# Patient Record
Sex: FEMALE | Race: BLACK OR AFRICAN AMERICAN | Employment: OTHER | URBAN - METROPOLITAN AREA
[De-identification: names, ages, dates, MRNs, and addresses within clinical notes are randomized per-mention and may not be internally consistent; named-entity substitution may affect disease eponyms.]

---

## 2017-10-23 ENCOUNTER — OFFICE VISIT (OUTPATIENT)
Dept: ORTHOPEDIC SURGERY | Age: 47
End: 2017-10-23

## 2017-10-23 VITALS
HEIGHT: 65 IN | DIASTOLIC BLOOD PRESSURE: 89 MMHG | HEART RATE: 97 BPM | WEIGHT: 192 LBS | BODY MASS INDEX: 31.99 KG/M2 | SYSTOLIC BLOOD PRESSURE: 129 MMHG

## 2017-10-23 DIAGNOSIS — M25.562 LEFT KNEE PAIN, UNSPECIFIED CHRONICITY: ICD-10-CM

## 2017-10-23 DIAGNOSIS — M25.561 RIGHT KNEE PAIN, UNSPECIFIED CHRONICITY: Primary | ICD-10-CM

## 2017-10-23 DIAGNOSIS — M17.0 BILATERAL PRIMARY OSTEOARTHRITIS OF KNEE: ICD-10-CM

## 2017-10-23 PROCEDURE — G8427 DOCREV CUR MEDS BY ELIG CLIN: HCPCS | Performed by: ORTHOPAEDIC SURGERY

## 2017-10-23 PROCEDURE — G8484 FLU IMMUNIZE NO ADMIN: HCPCS | Performed by: ORTHOPAEDIC SURGERY

## 2017-10-23 PROCEDURE — 99243 OFF/OP CNSLTJ NEW/EST LOW 30: CPT | Performed by: ORTHOPAEDIC SURGERY

## 2017-10-23 PROCEDURE — G8417 CALC BMI ABV UP PARAM F/U: HCPCS | Performed by: ORTHOPAEDIC SURGERY

## 2017-10-23 PROCEDURE — 20610 DRAIN/INJ JOINT/BURSA W/O US: CPT | Performed by: ORTHOPAEDIC SURGERY

## 2017-10-23 RX ORDER — MELOXICAM 7.5 MG/1
7.5 TABLET ORAL DAILY
Qty: 30 TABLET | Refills: 0 | Status: ON HOLD | OUTPATIENT
Start: 2017-10-23 | End: 2017-11-28 | Stop reason: ALTCHOICE

## 2017-10-23 RX ORDER — ACETAMINOPHEN 325 MG/1
650 TABLET ORAL EVERY 6 HOURS PRN
COMMUNITY
End: 2018-01-09

## 2017-10-23 NOTE — PROGRESS NOTES
Rachell Powell  [id number]  October 23, 2017    Chief Complaint   Patient presents with   Lucy Jaiden Knee Pain     Bilateral knee & lower leg pain since 10/17/17. No known injury. History: The patient is a 30-year-old female who is here for evaluation. Patient had bilateral knee pain denies any specific injury. [facility] The patient did have a Doppler of the right lower extremity which was unremarkable. This is a consult from Dahiana Crow MD  for bilateral knee pain. The patient does have a listed allergy to ibuprofen but she can't tolerate anti-inflammatories. She actually has been intermittently taking ibuprofen without issue. The patient's  past medical history, medications, allergies,  family history, social history, and have been reviewed, and dated and are recorded in the chart. Pertinent items are noted in HPI. Review of systems reviewed from Pertinent History Form dated on 10/23/17 and available in the patient's chart under the Media tab. Vitals:  /89   Pulse 97   Ht 5' 5\" (1.651 m)   Wt 192 lb (87.1 kg)   BMI 31.95 kg/m²     Physical: Ms. Rachell Powell appears well, she is in no apparent distress, she demonstrates appropriate mood & affect. She is alert and oriented to person, place and time. Examination of the bilateral lower extremity reveals no pain with range of motion of the hip. She has generalized tenderness to palpation about the joint line of the bilateral knee. Range of motion is from 0 degrees to 120 degrees bilaterally. Strength is 4+ to 5/5 for all muscle groups about the bilateral knee. There is patellofemoral crepitus with range of motion of the bilateral knee. Varus and valgus stressing of the knees reveals no evidence of instability. There is a large left knee effusion. Anterior drawer and Lachman are negative bilaterally.    Examination of the skin reveals no rashes, ulceration, or lesion, bilaterally in the

## 2017-11-28 PROBLEM — R50.9 FEVER: Status: RESOLVED | Noted: 2017-11-28 | Resolved: 2017-11-28

## 2017-11-28 PROBLEM — M54.9 BACK PAIN: Status: RESOLVED | Noted: 2017-11-28 | Resolved: 2017-11-28

## 2017-11-28 PROBLEM — R50.9 FEVER: Status: ACTIVE | Noted: 2017-11-28

## 2017-11-28 PROBLEM — M79.605 LEFT LEG PAIN: Status: RESOLVED | Noted: 2017-11-28 | Resolved: 2017-11-28

## 2017-11-28 PROBLEM — Z72.0 TOBACCO USE: Status: ACTIVE | Noted: 2017-11-28

## 2017-11-28 PROBLEM — R65.10 SIRS (SYSTEMIC INFLAMMATORY RESPONSE SYNDROME) (HCC): Status: ACTIVE | Noted: 2017-11-28

## 2017-11-28 PROBLEM — J32.9 SINUSITIS: Status: ACTIVE | Noted: 2017-11-28

## 2017-11-28 PROBLEM — M79.605 LEFT LEG PAIN: Status: ACTIVE | Noted: 2017-11-28

## 2017-11-28 PROBLEM — M54.9 BACK PAIN: Status: ACTIVE | Noted: 2017-11-28

## 2017-11-29 PROBLEM — R65.10 SIRS (SYSTEMIC INFLAMMATORY RESPONSE SYNDROME) (HCC): Status: RESOLVED | Noted: 2017-11-28 | Resolved: 2017-11-29

## 2017-12-04 ENCOUNTER — OFFICE VISIT (OUTPATIENT)
Dept: ORTHOPEDIC SURGERY | Age: 47
End: 2017-12-04

## 2017-12-04 VITALS
WEIGHT: 192 LBS | HEIGHT: 65 IN | HEART RATE: 83 BPM | DIASTOLIC BLOOD PRESSURE: 89 MMHG | RESPIRATION RATE: 16 BRPM | BODY MASS INDEX: 31.99 KG/M2 | SYSTOLIC BLOOD PRESSURE: 119 MMHG

## 2017-12-04 DIAGNOSIS — M25.572 ACUTE BILATERAL ANKLE PAIN: Primary | ICD-10-CM

## 2017-12-04 DIAGNOSIS — M25.571 ACUTE BILATERAL ANKLE PAIN: Primary | ICD-10-CM

## 2017-12-04 DIAGNOSIS — M21.41 PES PLANUS OF BOTH FEET: ICD-10-CM

## 2017-12-04 DIAGNOSIS — M21.42 PES PLANUS OF BOTH FEET: ICD-10-CM

## 2017-12-04 PROCEDURE — G8427 DOCREV CUR MEDS BY ELIG CLIN: HCPCS | Performed by: ORTHOPAEDIC SURGERY

## 2017-12-04 PROCEDURE — G8484 FLU IMMUNIZE NO ADMIN: HCPCS | Performed by: ORTHOPAEDIC SURGERY

## 2017-12-04 PROCEDURE — 4004F PT TOBACCO SCREEN RCVD TLK: CPT | Performed by: ORTHOPAEDIC SURGERY

## 2017-12-04 PROCEDURE — 99213 OFFICE O/P EST LOW 20 MIN: CPT | Performed by: ORTHOPAEDIC SURGERY

## 2017-12-04 PROCEDURE — G8417 CALC BMI ABV UP PARAM F/U: HCPCS | Performed by: ORTHOPAEDIC SURGERY

## 2017-12-04 NOTE — PROGRESS NOTES
Kanwal Bear  [id number]  December 4, 2017    Chief Complaint   Patient presents with    Ankle Pain     Lawson ankle pain        History: The patient is a 56 [address] female here today for evaluation regarding her bilateral ankle pain. She did receive bilateral knee injections on 10/23/17 with significant relief of her knee pain. She reports 90 pain today. She was recently hospitalized for low back pain with sinusitis. She reports she has had intermittent issues with her bilateral ankles for years but denies any recent injuries. She has intermittent swelling of her ankles and has recently noted increasing her ankle pain in the last 1-2 weeks without specific inciting event. She takes Tylenol arthritis, Norco, and uses heating pads. She denies any associated numbness or tingling in her feet or ankles today. The patient's  past medical history, medications, allergies,  family history, social history, and have been reviewed, and dated and are recorded in the chart. Pertinent items are noted in HPI. Review of systems reviewed from Pertinent History Form dated on 10/23/17 and available in the patient's chart under the Media tab. Vitals:  /89   Pulse 83   Resp 16   Ht 5' 5\" (1.651 m)   Wt 192 lb (87.1 kg)   BMI 31.95 kg/m²     Physical Examination:   The patient presents today in no acute distress, awake, alert, and oriented. She is well dressed, nourished and  groomed. Patient with normal affect. The patient ambulates with an normal stable gait. Examination of both ankles showing full range of motion of the bilateral ankles. There is no swelling that can be seen, or ecchymosis. She has moderately flexible flat feet bilaterally. She has mild generalized tenderness over the volar aspect of the foot. There is no tenderness over the anterior talofibular ligament and calcaneofibular ligament. The patient is non tender over the posterior talofibular ligament and the syndesmosis.  She has intact sensation to light touch in the tibial, peroneal, sural, and saphenous nerve distributions. Pedal pulses are present 2+. The foot shows brisk capillary refill. The patient is able to wiggle all toes. The patient is non tender to palpation of the mid foot, hind foot, or forefoot. The skin reveals no evidence of blistering or open areas. Imaging:  Xrays, 3 views of the bilateral ankle obtained in the office today were reviewed, and showed no abnormalities. Impression:   #1 bilateral pes planus       Plan:   The treatment plan was discussed in detail with the patient. The patient was given a prescription for custom molded arch supports. She was advised on stretching exercises for home.   She will follow-up in 6 weeks if her symptoms persist.

## 2017-12-16 PROBLEM — M25.559 HIP PAIN: Status: ACTIVE | Noted: 2017-12-16

## 2017-12-16 PROBLEM — M25.451 RIGHT HIP JOINT EFFUSION: Status: ACTIVE | Noted: 2017-12-16

## 2017-12-16 PROBLEM — R65.10 SIRS (SYSTEMIC INFLAMMATORY RESPONSE SYNDROME) (HCC): Status: ACTIVE | Noted: 2017-12-16

## 2017-12-17 PROBLEM — M25.551 PAIN OF RIGHT HIP JOINT: Status: ACTIVE | Noted: 2017-12-16

## 2017-12-18 ENCOUNTER — TELEPHONE (OUTPATIENT)
Dept: ORTHOPEDIC SURGERY | Age: 47
End: 2017-12-18

## 2017-12-20 PROBLEM — I10 HTN (HYPERTENSION): Status: ACTIVE | Noted: 2017-12-20

## 2017-12-20 PROBLEM — M00.9 SEPTIC HIP (HCC): Status: ACTIVE | Noted: 2017-12-20

## 2017-12-20 PROBLEM — E78.5 HLD (HYPERLIPIDEMIA): Status: ACTIVE | Noted: 2017-12-20

## 2017-12-21 ENCOUNTER — TELEPHONE (OUTPATIENT)
Dept: ORTHOPEDIC SURGERY | Age: 47
End: 2017-12-21

## 2017-12-27 ENCOUNTER — TELEPHONE (OUTPATIENT)
Dept: INFECTIOUS DISEASES | Age: 47
End: 2017-12-27

## 2017-12-27 NOTE — TELEPHONE ENCOUNTER
Pt called and stated she saw Dr Jim nuñez she was in the hospital and wanted to know if a followup appointment was needed. Please advise.

## 2018-01-02 ENCOUNTER — OFFICE VISIT (OUTPATIENT)
Dept: ORTHOPEDIC SURGERY | Age: 48
End: 2018-01-02

## 2018-01-02 VITALS — RESPIRATION RATE: 16 BRPM | HEIGHT: 65 IN | BODY MASS INDEX: 32.32 KG/M2 | WEIGHT: 194 LBS

## 2018-01-02 DIAGNOSIS — S63.501A RIGHT WRIST SPRAIN, INITIAL ENCOUNTER: Primary | ICD-10-CM

## 2018-01-02 DIAGNOSIS — M00.9 PYOGENIC ARTHRITIS OF RIGHT HIP, DUE TO UNSPECIFIED ORGANISM (HCC): ICD-10-CM

## 2018-01-02 PROCEDURE — 99213 OFFICE O/P EST LOW 20 MIN: CPT | Performed by: ORTHOPAEDIC SURGERY

## 2018-01-02 NOTE — PROGRESS NOTES
ORTHOPAEDIC PROGRESS NOTE    Chief Complaint   Patient presents with    Post-Op Check     post op rt hip arthrotomy with drainage of infection deep bone biopsy of right femoral neck region        HPI  FU from hospital  S/p I&D right hip  All cultures NGTD  Her hip pain is improved  She is able to weight bear with single crutche  Home therapy coming  She describes couple falls over past couple of days due to poor gait  She has walker at home  Broke her fall on [address] right wrist  Discharged home with PO doxycycline per ID    Denies fevers or chills      Vitals:    01/02/18 1040   Resp: 16   Weight: 194 lb (88 kg)   Height: 5' 5\" (1.651 m)       Physical Exam  NAD  Alert oriented x 4  SILT M/U/R/A nerve distributions; AIN/PIN/IO intact  SILT SP/DP/T/sural/saphenous nerve distributions; EHL/TA/GS intact  Right wrist - TTP dorsal wrist, mild swelling   No erythema/deformity   No crepitus  Right hip - anterior wound well healed, c/d/i. No erythema or drainage   Negative log roll   Active hip flexion > 90 without pain, she was not able to do this in the hospital       Imaging:  Images were personally reviewed by myself and discussed with the patient  Right wrist 3 views performed today in clinic - no acute osseous abnormalities    Assessment & Plan:  56 y.o. female following up for  1. Right wrist sprain, initial encounter  XR WRIST RIGHT (MIN 3 VIEWS)   2. Pyogenic arthritis of right hip, due to unspecified organism ([address]         No orders of the defined types were placed in this encounter. Wrist brace given, RICE, increase activity as tolerated  Right hip - abx per Dr Kiesha Castelan  Possibility inflammatory in nature, not infectious  Refer to rheumatology  progress RLE activities as tolerated  Recommend walker use for balance, especially in light of falls.     Alexandr Mosqueda

## 2018-01-03 ENCOUNTER — TELEPHONE (OUTPATIENT)
Dept: INTERNAL MEDICINE CLINIC | Age: 48
End: 2018-01-03

## 2018-01-03 NOTE — TELEPHONE ENCOUNTER
Referral from Dr Liss Haley pt made NP appt for Pyogenic arthritis of right hip-- records should be in [facility].

## 2018-01-09 ENCOUNTER — OFFICE VISIT (OUTPATIENT)
Dept: RHEUMATOLOGY | Age: 48
End: 2018-01-09

## 2018-01-09 VITALS
SYSTOLIC BLOOD PRESSURE: 110 MMHG | BODY MASS INDEX: 32.06 KG/M2 | DIASTOLIC BLOOD PRESSURE: 80 MMHG | WEIGHT: 192.4 LBS | HEIGHT: 65 IN

## 2018-01-09 DIAGNOSIS — M25.551 PAIN OF RIGHT HIP JOINT: Primary | ICD-10-CM

## 2018-01-09 DIAGNOSIS — M15.9 PRIMARY OSTEOARTHRITIS INVOLVING MULTIPLE JOINTS: ICD-10-CM

## 2018-01-09 DIAGNOSIS — M25.551 PAIN OF RIGHT HIP JOINT: ICD-10-CM

## 2018-01-09 LAB
A/G RATIO: 1.3 (ref 1.1–2.2)
ALBUMIN SERPL-MCNC: 4.1 G/DL (ref 3.4–5)
ALP BLD-CCNC: 121 U/L (ref 40–129)
ALT SERPL-CCNC: 19 U/L (ref 10–40)
ANION GAP SERPL CALCULATED.3IONS-SCNC: 14 MMOL/L (ref 3–16)
AST SERPL-CCNC: 19 U/L (ref 15–37)
BASOPHILS ABSOLUTE: 0.1 K/UL (ref 0–0.2)
BASOPHILS RELATIVE PERCENT: 0.7 %
BILIRUB SERPL-MCNC: <0.2 MG/DL (ref 0–1)
BUN BLDV-MCNC: 11 MG/DL (ref 7–20)
C-REACTIVE PROTEIN: 3.7 MG/L (ref 0–5.1)
CALCIUM SERPL-MCNC: 9.7 MG/DL (ref 8.3–10.6)
CHLORIDE BLD-SCNC: 101 MMOL/L (ref 99–110)
CO2: 28 MMOL/L (ref 21–32)
CREAT SERPL-MCNC: 0.7 MG/DL (ref 0.6–1.1)
EOSINOPHILS ABSOLUTE: 1.5 K/UL (ref 0–0.6)
EOSINOPHILS RELATIVE PERCENT: 19.1 %
GFR AFRICAN AMERICAN: >60
GFR NON-AFRICAN AMERICAN: >60
GLOBULIN: 3.2 G/DL
GLUCOSE BLD-MCNC: 81 MG/DL (ref 70–99)
HCT VFR BLD CALC: 35.2 % (ref 36–48)
HEMOGLOBIN: 11.5 G/DL (ref 12–16)
LYMPHOCYTES ABSOLUTE: 3.3 K/UL (ref 1–5.1)
LYMPHOCYTES RELATIVE PERCENT: 42.9 %
MCH RBC QN AUTO: 26.2 PG (ref 26–34)
MCHC RBC AUTO-ENTMCNC: 32.8 G/DL (ref 31–36)
MCV RBC AUTO: 80 FL (ref 80–100)
MONOCYTES ABSOLUTE: 0.6 K/UL (ref 0–1.3)
MONOCYTES RELATIVE PERCENT: 7.8 %
NEUTROPHILS ABSOLUTE: 2.3 K/UL (ref 1.7–7.7)
NEUTROPHILS RELATIVE PERCENT: 29.5 %
PDW BLD-RTO: 17 % (ref 12.4–15.4)
PLATELET # BLD: 389 K/UL (ref 135–450)
PMV BLD AUTO: 8.7 FL (ref 5–10.5)
POTASSIUM SERPL-SCNC: 4.7 MMOL/L (ref 3.5–5.1)
RBC # BLD: 4.4 M/UL (ref 4–5.2)
SEDIMENTATION RATE, ERYTHROCYTE: 30 MM/HR (ref 0–20)
SODIUM BLD-SCNC: 143 MMOL/L (ref 136–145)
TOTAL PROTEIN: 7.3 G/DL (ref 6.4–8.2)
URIC ACID, SERUM: 4.4 MG/DL (ref 2.6–6)
WBC # BLD: 7.7 K/UL (ref 4–11)

## 2018-01-09 PROCEDURE — G8484 FLU IMMUNIZE NO ADMIN: HCPCS | Performed by: INTERNAL MEDICINE

## 2018-01-09 PROCEDURE — 99244 OFF/OP CNSLTJ NEW/EST MOD 40: CPT | Performed by: INTERNAL MEDICINE

## 2018-01-09 PROCEDURE — G8417 CALC BMI ABV UP PARAM F/U: HCPCS | Performed by: INTERNAL MEDICINE

## 2018-01-09 PROCEDURE — G8427 DOCREV CUR MEDS BY ELIG CLIN: HCPCS | Performed by: INTERNAL MEDICINE

## 2018-01-09 NOTE — PROGRESS NOTES
albuterol (PROVENTIL) (5 MG/ML) 0.5% nebulizer solution Take 2.5 mg by nebulization every 4 hours as needed for Wheezing or Shortness of Breath.  omeprazole (PRILOSEC) 20 MG capsule Take 20 mg by mouth daily.  zolpidem (AMBIEN) 5 MG tablet Take 10 mg by mouth nightly as needed for Sleep  . No current facility-administered medications for this visit. ALLERGIES  Allergies   Allergen Reactions    Latex     Ace Inhibitors     Ibuprofen     Iv [Iodides]     Lisinopril Swelling    Morphine Hives     Nausea and itching    Other      TB skin test    Shellfish-Derived Products Swelling         Comments  No specialty comments available. REFERRING PHYSICIAN: Vani Lynn MD     HISTORY OF PRESENT ILLNESS  Arleth Mercado is a 56 y.o. female who is being seen for follow up evaluation of right hip pain and swelling. She was admitted to the hospital For sinus infection and developed for right hip pain and swelling associated with fever. She had incision and drainage of right hip with negative fluid cultures and a CBC count of 2838 and neutrophils at 83%. She was prescribed IV antibiotics. She was discharged home and returned within 24 hours with worsening pain in the right hip. CT right hip Showed hip effusion. She was prescribed IV antibiotics, and arthrotomy with deep bone biopsy which showed mild chronic inflammation. She was discharged home on oral antibiotics and her symptoms have improved since. She denies any preceding viral infection, urinary tract infection, chronic diarrhea or genital discharge. No history of psoriasis, inflammatory bowel disease, inflammatory low back pain, uveitis, enthesitis, tenosynovitis, dactylitis. She is also complaining of intermittent pain in wrists, knees, ankles, pain radiates from one joint to another and is associated with intermittent swelling and morning stiffness lasting for 45 minutes.   Pain lasts for 1-2 weeks at a time and improves with oral Asthma     CHF (congestive heart failure) (HCC)     COPD (chronic obstructive pulmonary disease) (HCC)     DVT of upper extremity (deep vein thrombosis) (HCC)     Gout     Headache(784.0)     Hypertension     Thyroid disease      Past Surgical History:   Procedure Laterality Date    BRAIN SURGERY      aneurysm clippings     SECTION      x2    CHOLECYSTECTOMY      FOOT SURGERY      HIP SURGERY Right     HYSTERECTOMY      NOSE SURGERY       Social History     Social History    Marital status: Single     Spouse name: N/A    Number of children: N/A    Years of education: N/A     Occupational History    Homemaker      Social History Main Topics    Smoking status: Current Every Day Smoker     Packs/day: 0.50     Years: 20.00     Types: Cigarettes    Smokeless tobacco: Never Used    Alcohol use Yes      Comment: hols and birthdays    Drug use: No    Sexual activity: Yes     Partners: Male     Other Topics Concern    Not on file     Social History Narrative    No narrative on file     Family History   Problem Relation Age of Onset    Hearing Loss Mother     Cancer Maternal Aunt     Hearing Loss Maternal Uncle     Hearing Loss Paternal Aunt     Cancer Paternal Grandmother          PHYSICAL EXAM   Vitals:    18 1315   BP: 110/80   Weight: 192 lb 6.4 oz (87.3 kg)   Height: 5' 5\" (1.651 m)     Physical Exam  Constitutional:  Well developed, well nourished, no acute distress, non-toxic appearance   Musculoskeletal:   RIGHT  Swell  Tender  ROM  LEFT  Swell  Tender  ROM    DIP2  0  0  FULL   0  0  FULL    DIP3  0  0  FULL   0  0  FULL    DIP4  0  0  FULL   0  0  FULL    DIP5  0  0  FULL   0  0  FULL    PIP1  0  0  FULL   0  0  FULL    PIP2  0  0  FULL   0  0  FULL    PIP3  0  0  FULL   0  0  FULL    PIP4  0  0  FULL   0  0  FULL    PIP5  0  0  FULL   0  0  FULL    MCP1  0  0  FULL   0  0  FULL    MCP2  0  0  FULL   0  0  FULL    MCP3  0  0  FULL   0  0  FULL    MCP4  0  0  FULL   0 0  FULL    MCP5  0  0  FULL   0  0  FULL    Wrist  + + FULL   0  0  FULL    Elbow  0  0  FULL   0  0  FULL    Shouldr  0  0  FULL   0  0  FULL    Hip  0  + Reduced ROM   0  0  FULL    Knee  + + Crepitus/varus  0  0  Crepitus    Ankle  0  + reduced  0  0  FULL    MTP1  0  0  FULL   0  0  FULL    MTP2  0  0  FULL   0  0  FULL    MTP3  0  0  FULL   0  0  FULL    MTP4  0  0  FULL   0  0  FULL    MTP5  0  0  FULL   0  0  FULL    IP1  0  0  FULL   0  0  FULL    IP2  0  0  FULL   0  0  FULL    IP3  0  0  FULL   0  0  FULL    IP4  0  0  FULL   0  0  FULL    IP5  0  0  FULL   0 0 FULL        Ambulates without assistance, normal gait  Neck: Full ROM, no tenderness, supple   Back- no tenderness. Eyes:  PERRL, extra ocular movements intact, conjunctiva normal   HEENT:  Atraumatic, normocephalic, external ears normal, oropharynx moist, no pharyngeal exudates. Respiratory:  No respiratory distress  GI:  Soft, nondistended, normal bowel sounds, nontender, no organomegaly, no mass, no rebound, no guarding   :  No costovertebral angle tenderness   Integument:  Well hydrated, no rash or telangiectasias  Lymphatic:  No lymphadenopathy noted   Neurologic:   Alert & oriented x 3, CN 2-12 normal, no focal deficits noted. Sensations Intact. Muscle strength 5/5 proximally and distally in upper and lower extremities.    Psychiatric:  Speech and behavior appropriate           LABS AND IMAGING  Outside data reviewed and in HPI    Lab Results   Component Value Date    WBC 7.7 01/09/2018    RBC 4.40 01/09/2018    HGB 11.5 01/09/2018    HCT 35.2 01/09/2018     01/09/2018    MCV 80.0 01/09/2018    MCH 26.2 01/09/2018    MCHC 32.8 01/09/2018    RDW 17.0 01/09/2018    SEGSPCT 45.0 10/22/2012    BANDSPCT 1 05/14/2015    LYMPHOPCT 42.9 01/09/2018    MONOPCT 7.8 01/09/2018    EOSPCT 3.5 10/13/2010    BASOPCT 0.7 01/09/2018    MONOSABS 0.6 01/09/2018    LYMPHSABS 3.3 01/09/2018    EOSABS 1.5 01/09/2018    BASOSABS 0.1 01/09/2018    DIFFTYPE

## 2018-01-10 LAB
C. TRACHOMATIS DNA ,URINE: NEGATIVE
HEPATITIS B CORE IGM ANTIBODY: NORMAL
HEPATITIS B SURFACE ANTIGEN INTERPRETATION: NORMAL
HEPATITIS C ANTIBODY INTERPRETATION: REACTIVE
N. GONORRHOEAE DNA, URINE: NEGATIVE

## 2018-01-11 DIAGNOSIS — R76.8 HEPATITIS C ANTIBODY TEST POSITIVE: Primary | ICD-10-CM

## 2018-01-11 LAB
ANA BY IFA: NORMAL
CCP IGG ANTIBODIES: 10 UNITS (ref 0–19)

## 2018-01-19 ENCOUNTER — TELEPHONE (OUTPATIENT)
Dept: INTERNAL MEDICINE CLINIC | Age: 48
End: 2018-01-19

## 2018-03-15 ENCOUNTER — OFFICE VISIT (OUTPATIENT)
Dept: RHEUMATOLOGY | Age: 48
End: 2018-03-15

## 2018-03-15 VITALS
HEIGHT: 65 IN | DIASTOLIC BLOOD PRESSURE: 92 MMHG | SYSTOLIC BLOOD PRESSURE: 142 MMHG | BODY MASS INDEX: 32.22 KG/M2 | HEART RATE: 84 BPM | WEIGHT: 193.4 LBS | OXYGEN SATURATION: 97 %

## 2018-03-15 DIAGNOSIS — M19.90 INFLAMMATORY ARTHRITIS: Primary | ICD-10-CM

## 2018-03-15 DIAGNOSIS — M19.90 INFLAMMATORY ARTHRITIS: ICD-10-CM

## 2018-03-15 DIAGNOSIS — M15.9 PRIMARY OSTEOARTHRITIS INVOLVING MULTIPLE JOINTS: ICD-10-CM

## 2018-03-15 DIAGNOSIS — R76.8 HEPATITIS C ANTIBODY TEST POSITIVE: ICD-10-CM

## 2018-03-15 LAB
A/G RATIO: 1.3 (ref 1.1–2.2)
ALBUMIN SERPL-MCNC: 4.3 G/DL (ref 3.4–5)
ALP BLD-CCNC: 138 U/L (ref 40–129)
ALT SERPL-CCNC: 26 U/L (ref 10–40)
ANION GAP SERPL CALCULATED.3IONS-SCNC: 16 MMOL/L (ref 3–16)
AST SERPL-CCNC: 26 U/L (ref 15–37)
BASOPHILS ABSOLUTE: 0.1 K/UL (ref 0–0.2)
BASOPHILS RELATIVE PERCENT: 1.1 %
BILIRUB SERPL-MCNC: <0.2 MG/DL (ref 0–1)
BUN BLDV-MCNC: 8 MG/DL (ref 7–20)
C-REACTIVE PROTEIN: 45.3 MG/L (ref 0–5.1)
CALCIUM SERPL-MCNC: 9.7 MG/DL (ref 8.3–10.6)
CHLORIDE BLD-SCNC: 97 MMOL/L (ref 99–110)
CO2: 28 MMOL/L (ref 21–32)
CREAT SERPL-MCNC: 0.7 MG/DL (ref 0.6–1.1)
EOSINOPHILS ABSOLUTE: 0.2 K/UL (ref 0–0.6)
EOSINOPHILS RELATIVE PERCENT: 2.8 %
GFR AFRICAN AMERICAN: >60
GFR NON-AFRICAN AMERICAN: >60
GLOBULIN: 3.3 G/DL
GLUCOSE BLD-MCNC: 116 MG/DL (ref 70–99)
HCT VFR BLD CALC: 40.8 % (ref 36–48)
HEMOGLOBIN: 13.2 G/DL (ref 12–16)
LYMPHOCYTES ABSOLUTE: 2.8 K/UL (ref 1–5.1)
LYMPHOCYTES RELATIVE PERCENT: 36.5 %
MCH RBC QN AUTO: 26.2 PG (ref 26–34)
MCHC RBC AUTO-ENTMCNC: 32.5 G/DL (ref 31–36)
MCV RBC AUTO: 80.8 FL (ref 80–100)
MONOCYTES ABSOLUTE: 0.5 K/UL (ref 0–1.3)
MONOCYTES RELATIVE PERCENT: 6.9 %
NEUTROPHILS ABSOLUTE: 4 K/UL (ref 1.7–7.7)
NEUTROPHILS RELATIVE PERCENT: 52.7 %
PDW BLD-RTO: 16.1 % (ref 12.4–15.4)
PLATELET # BLD: 390 K/UL (ref 135–450)
PMV BLD AUTO: 8.8 FL (ref 5–10.5)
POTASSIUM SERPL-SCNC: 3.9 MMOL/L (ref 3.5–5.1)
RBC # BLD: 5.05 M/UL (ref 4–5.2)
SEDIMENTATION RATE, ERYTHROCYTE: 35 MM/HR (ref 0–20)
SODIUM BLD-SCNC: 141 MMOL/L (ref 136–145)
TOTAL PROTEIN: 7.6 G/DL (ref 6.4–8.2)
WBC # BLD: 7.5 K/UL (ref 4–11)

## 2018-03-15 PROCEDURE — G8417 CALC BMI ABV UP PARAM F/U: HCPCS | Performed by: INTERNAL MEDICINE

## 2018-03-15 PROCEDURE — 4004F PT TOBACCO SCREEN RCVD TLK: CPT | Performed by: INTERNAL MEDICINE

## 2018-03-15 PROCEDURE — G8427 DOCREV CUR MEDS BY ELIG CLIN: HCPCS | Performed by: INTERNAL MEDICINE

## 2018-03-15 PROCEDURE — G8484 FLU IMMUNIZE NO ADMIN: HCPCS | Performed by: INTERNAL MEDICINE

## 2018-03-15 PROCEDURE — 99214 OFFICE O/P EST MOD 30 MIN: CPT | Performed by: INTERNAL MEDICINE

## 2018-03-15 RX ORDER — PREDNISONE 1 MG/1
TABLET ORAL
Qty: 127 TABLET | Refills: 0 | Status: SHIPPED | OUTPATIENT
Start: 2018-03-15 | End: 2018-04-14 | Stop reason: ALTCHOICE

## 2018-03-15 NOTE — PROGRESS NOTES
times daily.  furosemide (LASIX) 40 MG tablet Take 40 mg by mouth daily      lactulose (CHRONULAC) 10 GM/15ML solution Take 30 g by mouth daily      bisacodyl (DULCOLAX) 5 MG EC tablet Take 5 mg by mouth daily as needed for Constipation      ondansetron (ZOFRAN ODT) 4 MG disintegrating tablet Take 1 tablet by mouth every 8 hours as needed for Nausea 20 tablet 0    HYDROcodone-acetaminophen (NORCO) 5-325 MG per tablet Take 1 tablet by mouth every 6 hours as needed for Pain      cloNIDine (CATAPRES) 0.3 MG tablet Take 0.3 mg by mouth three times daily      NIFEdipine (PROCARDIA XL) 90 MG extended release tablet Take 1 tablet by mouth daily 30 tablet 3    omeprazole (PRILOSEC) 20 MG capsule Take 20 mg by mouth daily.  zolpidem (AMBIEN) 5 MG tablet Take 10 mg by mouth nightly as needed for Sleep  . No current facility-administered medications for this visit. ALLERGIES  Allergies   Allergen Reactions    Latex     Ace Inhibitors     Ibuprofen     Iv [Iodides]     Lisinopril Swelling    Morphine Hives     Nausea and itching    Other      TB skin test    Shellfish-Derived Products Swelling         Comments  No specialty comments available. Background history:  Arsh Allen is a 56 y.o. female who is being seen for follow up evaluation of right hip pain and swelling. She was admitted to the hospital For sinus infection and developed for right hip pain and swelling associated with fever. She had incision and drainage of right hip with negative fluid cultures and a CBC count of 2838 and neutrophils at 83%. She was prescribed IV antibiotics. She was discharged home and returned within 24 hours with worsening pain in the right hip. CT right hip Showed hip effusion. She was prescribed IV antibiotics, and arthrotomy with deep bone biopsy which showed mild chronic inflammation. She was discharged home on oral antibiotics and her symptoms have improved since.   She denies any Types: Cigarettes    Smokeless tobacco: Current User    Alcohol use Yes      Comment: holidays and birthdays    Drug use: No    Sexual activity: Yes     Partners: Male     Other Topics Concern    Not on file     Social History Narrative    No narrative on file     Family History   Problem Relation Age of Onset    Hearing Loss Mother     Cancer Maternal Aunt     Hearing Loss Maternal Uncle     Hearing Loss Paternal Aunt     Cancer Paternal Grandmother          PHYSICAL EXAM   Vitals:    03/15/18 1421   BP: (!) 142/92   Pulse: 84   SpO2: 97%   Weight: 193 lb 6.4 oz (87.7 kg)   Height: 5' 5\" (1.651 m)     Physical Exam  Constitutional:  Well developed, well nourished, no acute distress, non-toxic appearance   Musculoskeletal:   RIGHT  Swell  Tender  ROM  LEFT  Swell  Tender  ROM    DIP2  0  0  FULL   0  0  FULL    DIP3  0  0  FULL   0  0  FULL    DIP4  0  0  FULL   0  0  FULL    DIP5  0  0  FULL   0  0  FULL    PIP1  0  0  FULL   0  0  FULL    PIP2  0  0  FULL   0  0  FULL    PIP3  0  0  FULL   0  0  FULL    PIP4  0  0  FULL   0  0  FULL    PIP5  0  0  FULL   0  0  FULL    MCP1  0  0  FULL   0  0  FULL    MCP2  0  0  FULL   0  0  FULL    MCP3  0  0  FULL   0  0  FULL    MCP4  0  0  FULL   0  0  FULL    MCP5  0  0  FULL   0  0  FULL    Wrist  0 0 FULL   0  + FULL    Elbow  0  0  FULL   0  0  FULL    Shouldr  0  0  FULL   0  0  FULL    Hip  0  + Reduced ROM   0  0  FULL    Knee  + + Crepitus/varus  0  0  Crepitus    Ankle  0  + reduced  0  0  FULL    MTP1  0  0  FULL   0  0  FULL    MTP2  0  0  FULL   0  0  FULL    MTP3  0  0  FULL   0  0  FULL    MTP4  0  0  FULL   0  0  FULL    MTP5  0  0  FULL   0  0  FULL    IP1  0  0  FULL   0  0  FULL    IP2  0  0  FULL   0  0  FULL    IP3  0  0  FULL   0  0  FULL    IP4  0  0  FULL   0  0  FULL    IP5  0  0  FULL   0 0 FULL      Tenosynovitis of the left wrist, left Achilles/medial ankle tendinitis and right knee bursitis/enthesitis (pain and swelling located on the Lab Results   Component Value Date    NARA Negative 03/03/2010     Lab Results   Component Value Date    RF <10.0 12/21/2017    CCPABIGG 10 01/09/2018     Lab Results   Component Value Date    NARA Negative 03/03/2010     ASSESSMENT AND PLAN      Assessment/Plan:      ASSESSMENT:    1. Inflammatory arthritis    2. Primary osteoarthritis involving multiple joints    3. Hepatitis C antibody test positive        PLAN:   1. Inflammatory arthritis  -Left wrist tenosynovitis, left ankle tendinitis, right knee bursitis/enthesitis. Previous history of right hip pain with effusion and questionable dactylitis  -Possible peripheral spondyloarthropathy. Denies psoriasis or inflammatory bowel disease or inflammatory back pain, recent diarrhea or urinary tract infection/discharge. -RF, CCP negative, NARA negative  I will check HLA-B27 and x-rays of SI joints  -I will start prednisone taper  -I'll see her back in 3 weeks to discuss results and treatment options  - XR SacroilIAC Joints PA and Oblique; Future  - HLA-B27 ANTIGEN; Future  - C-Reactive Protein; Future  - Sedimentation Rate; Future  - CBC Auto Differential; Future  - Comprehensive Metabolic Panel; Future  - predniSONE (DELTASONE) 5 MG tablet; Take 4 tabs a day for 1 week, 3.5 tabs a day for 1 week, 3 pills a day for 1 week, 2.5 pills a day for 1 week, 2 pills a day for 1 week, 1.5 pills a day for 1 week, 1 pill a day for 1 week, 0.5 pill a day for 1 week and then stop  Dispense: 127 tablet; Refill: 0    2. Primary osteoarthritis involving multiple joints  Continue gabapentin    NSAIDs as needed    3. Hepatitis C antibody test positive  I will check hepatitis C virus load. - Hepatitis C RNA, quantitative, PCR  -Depending on the results I will refer to hepatology    The patient indicates understanding of these issues and agrees with the plan. Return in about 3 weeks (around 4/5/2018).       The risks and benefits of my recommendations, as well as other treatment

## 2018-03-17 LAB — HLA B27: NEGATIVE

## 2018-03-18 LAB
EER HCV RNA QNT PCR: NORMAL
HCV RNA QNT REAL-TIME PCR INTERP: NOT DETECTED
HCV RNA, QUANTITATIVE REAL TIME PCR: <1.2 LOG IU
HEPATITIS C RNA PCR QUANT: <15 IU/ML

## 2018-04-12 PROBLEM — J32.9 SINUSITIS: Status: RESOLVED | Noted: 2017-11-28 | Resolved: 2018-04-12

## 2018-04-14 PROBLEM — A41.9 SEPSIS (HCC): Status: ACTIVE | Noted: 2018-04-14

## 2018-04-26 ENCOUNTER — OFFICE VISIT (OUTPATIENT)
Dept: RHEUMATOLOGY | Age: 48
End: 2018-04-26

## 2018-04-26 VITALS
HEART RATE: 94 BPM | HEIGHT: 65 IN | BODY MASS INDEX: 31.36 KG/M2 | DIASTOLIC BLOOD PRESSURE: 110 MMHG | SYSTOLIC BLOOD PRESSURE: 169 MMHG | OXYGEN SATURATION: 98 % | WEIGHT: 188.2 LBS

## 2018-04-26 DIAGNOSIS — R22.0 FACIAL SWELLING: ICD-10-CM

## 2018-04-26 DIAGNOSIS — M19.90 INFLAMMATORY ARTHRITIS: Primary | ICD-10-CM

## 2018-04-26 PROCEDURE — G8428 CUR MEDS NOT DOCUMENT: HCPCS | Performed by: INTERNAL MEDICINE

## 2018-04-26 PROCEDURE — 99214 OFFICE O/P EST MOD 30 MIN: CPT | Performed by: INTERNAL MEDICINE

## 2018-04-26 PROCEDURE — 4004F PT TOBACCO SCREEN RCVD TLK: CPT | Performed by: INTERNAL MEDICINE

## 2018-04-26 PROCEDURE — G8417 CALC BMI ABV UP PARAM F/U: HCPCS | Performed by: INTERNAL MEDICINE

## 2018-04-26 RX ORDER — PREDNISONE 20 MG/1
20 TABLET ORAL DAILY
Qty: 30 TABLET | Refills: 0 | Status: SHIPPED | OUTPATIENT
Start: 2018-04-26 | End: 2018-05-26

## 2019-05-19 ENCOUNTER — APPOINTMENT (OUTPATIENT)
Dept: CT IMAGING | Age: 49
End: 2019-05-19
Payer: MEDICARE

## 2019-05-19 ENCOUNTER — HOSPITAL ENCOUNTER (EMERGENCY)
Age: 49
Discharge: HOME | End: 2019-05-19
Attending: EMERGENCY MEDICINE
Payer: MEDICARE

## 2019-05-19 VITALS
HEART RATE: 71 BPM | BODY MASS INDEX: 32.69 KG/M2 | DIASTOLIC BLOOD PRESSURE: 87 MMHG | TEMPERATURE: 99.1 F | RESPIRATION RATE: 18 BRPM | OXYGEN SATURATION: 96 % | SYSTOLIC BLOOD PRESSURE: 144 MMHG | WEIGHT: 196.43 LBS

## 2019-05-19 DIAGNOSIS — R51.9 HEADACHE DISORDER: ICD-10-CM

## 2019-05-19 DIAGNOSIS — I10 UNCONTROLLED HYPERTENSION: Primary | ICD-10-CM

## 2019-05-19 DIAGNOSIS — R11.0 NAUSEA: ICD-10-CM

## 2019-05-19 LAB
A/G RATIO: 1.4 (ref 1.1–2.2)
ALBUMIN SERPL-MCNC: 4.6 G/DL (ref 3.4–5)
ALP BLD-CCNC: 127 U/L (ref 40–129)
ALT SERPL-CCNC: 17 U/L (ref 10–40)
ANION GAP SERPL CALCULATED.3IONS-SCNC: 10 MMOL/L (ref 3–16)
AST SERPL-CCNC: 18 U/L (ref 15–37)
BASOPHILS ABSOLUTE: 0.1 K/UL (ref 0–0.2)
BASOPHILS RELATIVE PERCENT: 1.4 %
BILIRUB SERPL-MCNC: <0.2 MG/DL (ref 0–1)
BUN BLDV-MCNC: 9 MG/DL (ref 7–20)
CALCIUM SERPL-MCNC: 10.2 MG/DL (ref 8.3–10.6)
CHLORIDE BLD-SCNC: 104 MMOL/L (ref 99–110)
CO2: 28 MMOL/L (ref 21–32)
CREAT SERPL-MCNC: 0.8 MG/DL (ref 0.6–1.1)
EOSINOPHILS ABSOLUTE: 0.2 K/UL (ref 0–0.6)
EOSINOPHILS RELATIVE PERCENT: 2.9 %
GFR AFRICAN AMERICAN: >60
GFR NON-AFRICAN AMERICAN: >60
GLOBULIN: 3.3 G/DL
GLUCOSE BLD-MCNC: 115 MG/DL (ref 70–99)
HCT VFR BLD CALC: 41.6 % (ref 36–48)
HEMOGLOBIN: 13.9 G/DL (ref 12–16)
LYMPHOCYTES ABSOLUTE: 3.1 K/UL (ref 1–5.1)
LYMPHOCYTES RELATIVE PERCENT: 35.9 %
MCH RBC QN AUTO: 26.8 PG (ref 26–34)
MCHC RBC AUTO-ENTMCNC: 33.4 G/DL (ref 31–36)
MCV RBC AUTO: 80.4 FL (ref 80–100)
MONOCYTES ABSOLUTE: 0.7 K/UL (ref 0–1.3)
MONOCYTES RELATIVE PERCENT: 7.7 %
NEUTROPHILS ABSOLUTE: 4.4 K/UL (ref 1.7–7.7)
NEUTROPHILS RELATIVE PERCENT: 52.1 %
PDW BLD-RTO: 15.6 % (ref 12.4–15.4)
PLATELET # BLD: 244 K/UL (ref 135–450)
PMV BLD AUTO: 8.8 FL (ref 5–10.5)
POTASSIUM REFLEX MAGNESIUM: 3.6 MMOL/L (ref 3.5–5.1)
RBC # BLD: 5.17 M/UL (ref 4–5.2)
SODIUM BLD-SCNC: 142 MMOL/L (ref 136–145)
TOTAL PROTEIN: 7.9 G/DL (ref 6.4–8.2)
TROPONIN: <0.01 NG/ML
WBC # BLD: 8.5 K/UL (ref 4–11)

## 2019-05-19 PROCEDURE — 84484 ASSAY OF TROPONIN QUANT: CPT

## 2019-05-19 PROCEDURE — 99284 EMERGENCY DEPT VISIT MOD MDM: CPT

## 2019-05-19 PROCEDURE — 93005 ELECTROCARDIOGRAM TRACING: CPT | Performed by: EMERGENCY MEDICINE

## 2019-05-19 PROCEDURE — 80053 COMPREHEN METABOLIC PANEL: CPT

## 2019-05-19 PROCEDURE — 70450 CT HEAD/BRAIN W/O DYE: CPT

## 2019-05-19 PROCEDURE — 36415 COLL VENOUS BLD VENIPUNCTURE: CPT

## 2019-05-19 PROCEDURE — 85025 COMPLETE CBC W/AUTO DIFF WBC: CPT

## 2019-05-19 PROCEDURE — 6370000000 HC RX 637 (ALT 250 FOR IP): Performed by: EMERGENCY MEDICINE

## 2019-05-19 RX ORDER — HYDROCODONE BITARTRATE AND ACETAMINOPHEN 5; 325 MG/1; MG/1
2 TABLET ORAL ONCE
Status: COMPLETED | OUTPATIENT
Start: 2019-05-19 | End: 2019-05-19

## 2019-05-19 RX ORDER — HYDRALAZINE HYDROCHLORIDE 25 MG/1
50 TABLET, FILM COATED ORAL ONCE
Status: COMPLETED | OUTPATIENT
Start: 2019-05-19 | End: 2019-05-19

## 2019-05-19 RX ORDER — ONDANSETRON 4 MG/1
8 TABLET, ORALLY DISINTEGRATING ORAL ONCE
Status: COMPLETED | OUTPATIENT
Start: 2019-05-19 | End: 2019-05-19

## 2019-05-19 RX ORDER — CLONIDINE HYDROCHLORIDE 0.1 MG/1
0.3 TABLET ORAL ONCE
Status: COMPLETED | OUTPATIENT
Start: 2019-05-19 | End: 2019-05-19

## 2019-05-19 RX ORDER — ONDANSETRON 4 MG/1
4 TABLET, ORALLY DISINTEGRATING ORAL EVERY 8 HOURS PRN
Qty: 20 TABLET | Refills: 0 | Status: SHIPPED | OUTPATIENT
Start: 2019-05-19

## 2019-05-19 RX ADMIN — ONDANSETRON 8 MG: 4 TABLET, ORALLY DISINTEGRATING ORAL at 20:29

## 2019-05-19 RX ADMIN — HYDROCODONE BITARTRATE AND ACETAMINOPHEN 2 TABLET: 5; 325 TABLET ORAL at 20:30

## 2019-05-19 RX ADMIN — HYDRALAZINE HYDROCHLORIDE 50 MG: 25 TABLET, FILM COATED ORAL at 20:29

## 2019-05-19 RX ADMIN — CLONIDINE HYDROCHLORIDE 0.3 MG: 0.1 TABLET ORAL at 20:29

## 2019-05-19 ASSESSMENT — PAIN DESCRIPTION - PAIN TYPE
TYPE: ACUTE PAIN

## 2019-05-19 ASSESSMENT — PAIN SCALES - GENERAL
PAINLEVEL_OUTOF10: 7
PAINLEVEL_OUTOF10: 7
PAINLEVEL_OUTOF10: 10
PAINLEVEL_OUTOF10: 10

## 2019-05-19 ASSESSMENT — PAIN DESCRIPTION - FREQUENCY: FREQUENCY: INTERMITTENT

## 2019-05-19 ASSESSMENT — PAIN DESCRIPTION - LOCATION
LOCATION: HEAD
LOCATION: HEAD;GENERALIZED
LOCATION: HEAD

## 2019-05-19 ASSESSMENT — PAIN DESCRIPTION - DESCRIPTORS: DESCRIPTORS: THROBBING

## 2019-05-20 LAB
EKG ATRIAL RATE: 76 BPM
EKG DIAGNOSIS: NORMAL
EKG P AXIS: 64 DEGREES
EKG P-R INTERVAL: 180 MS
EKG Q-T INTERVAL: 386 MS
EKG QRS DURATION: 92 MS
EKG QTC CALCULATION (BAZETT): 434 MS
EKG R AXIS: 42 DEGREES
EKG T AXIS: 23 DEGREES
EKG VENTRICULAR RATE: 76 BPM

## 2019-05-20 PROCEDURE — 93010 ELECTROCARDIOGRAM REPORT: CPT | Performed by: INTERNAL MEDICINE

## 2019-05-20 NOTE — ED NOTES
Discharge instructions given voiced understanding said her pain was 7 said she would take something when she got home to get rid of headache. Pt happy to have bp down.  Pt knows its important to take her bp meds     Dawna Ortega RN  05/19/19 [id number]

## 2019-05-20 NOTE — ED NOTES
Pt said pain down to a 8 said she feels a little better bp 162/84     Sujey Gonzalez RN  05/19/19 [id number]

## 2019-05-20 NOTE — ED PROVIDER NOTES
[facility] ENCOUNTER      Pt Name: Lizzeth Hector  MRN: [id number]  [place] 1970  Date of evaluation: 5/19/2019  Provider: Blake Flowers DO    CHIEF COMPLAINT       Chief Complaint   Patient presents with    Hypertension     PT SAID HER BP HAS BEEN ELEVATED C/O NAUSEA BODY ACHES WEAK         HISTORY OF PRESENT ILLNESS   (Location/Symptom, Timing/Onset, Context/Setting, Quality, Duration, Modifying Factors, Severity)  Note limiting factors. Lizzeth Hector is a 51 y.o. female who presents to the emergency department with complaint of elevated blood pressure, headache, body aches and nausea. Patient reports that yesterday she got in a verbal altercation with her mother. Reports this upset her and caused her to have a headache. She checked her blood pressure and it was elevated. She reports today she has not taken her blood pressure medications because she's been upset about the fight. States that this is caused her to have a headache that gradually came on this morning. States it's not the worst headache of her life and happens similarly in the past when she has elevated blood pressures. Denies it being thunderclap in nature. She also is reporting nausea which she has chronically and normally takes Zofran and Phenergan however she is out of her Zofran. She denies any abdominal pain. She also states that she has generalized body aches. Denies any chest pain, shortness of breath, vomiting, diarrhea, dysuria, urgency, frequency, fever, neck pain. Patient has a history of brain aneurysmal clippings in the past.      Nursing Notes were reviewed.       PAST MEDICAL HISTORY     Past Medical History:   Diagnosis Date    Arthritis     Asthma     [facility]estive [facility] failure) [facility]     COPD (chronic obstructive pulmonary disease) ([facility]     DVT of upper extremity (deep vein thrombosis) (HCC)     Gout     Headache(784.0)     Hypertension     Thyroid mg by mouth daily. PROMETHAZINE (PHENERGAN) 25 MG TABLET    Take 25 mg by mouth every 8 hours as needed for Nausea    TRIAMCINOLONE (KENALOG) 0.1 % CREAM    Apply topically daily Apply topically 2 times daily. ZOLPIDEM (AMBIEN) 5 MG TABLET    Take 10 mg by mouth nightly as needed for Sleep  . ALLERGIES     Latex; Ace inhibitors; Ibuprofen; Iv [iodides]; Lisinopril; Morphine;  Other; and Shellfish-derived products    FAMILY HISTORY       Family History   Problem Relation Age of Onset    Hearing Loss Mother     Cancer Maternal Aunt     Hearing Loss Maternal Uncle     Hearing Loss Paternal Aunt     Cancer Paternal Grandmother           SOCIAL HISTORY       Social History     Socioeconomic History    Marital status: Single     Spouse name: None    Number of children: None    Years of education: None    Highest education level: None   Occupational History    Occupation: Homemaker   Social Needs    Financial resource strain: None    Food insecurity:     Worry: None     Inability: None    Transportation needs:     Medical: None     Non-medical: None   Tobacco Use    Smoking status: Current Every Day Smoker     Packs/day: 0.50     Years: 20.00     Pack years: 10.00     Types: Cigarettes    Smokeless tobacco: Current User   Substance and Sexual Activity    Alcohol use: Yes     Comment: holidays and birthdays    Drug use: No    Sexual activity: Yes     Partners: Male   Lifestyle    Physical activity:     Days per week: None     Minutes per session: None    Stress: None   Relationships    Social connections:     Talks on phone: None     Gets together: None     Attends Restoration service: None     Active member of club or organization: None     Attends meetings of clubs or organizations: None     Relationship status: None    Intimate partner violence:     Fear of current or ex partner: None     Emotionally abused: None     Physically abused: None     Forced sexual activity: None   Other Topics Performed at:  [facility]  [address] [facility]   Phone [phone]   COMPREHENSIVE METABOLIC PANEL W/ REFLEX TO MG FOR LOW K - Abnormal; Notable for the following components:    Glucose 115 (*)     All other components within normal limits    Narrative:     Performed at:  [facility]  [address] [facility]   Phone [phone]   TROPONIN    Narrative:     Performed at:  [facility]  [address] [facility]   Phone [phone]       All other labs were within normal range or not returned as of this dictation. EMERGENCY DEPARTMENT COURSE and DIFFERENTIAL DIAGNOSIS/MDM:   Vitals:    Vitals:    05/19/19 2010 05/19/19 2049 05/19/19 2102   BP: (!) 198/115 (!) 203/108 (!) 185/89   Pulse: 82 78 76   Resp: 18  18   Temp: 99.1 °F (37.3 °C)     SpO2: 100%  98%   Weight: 196 lb 6.9 oz (89.1 kg)           MDM  43-year-old female presents to the emergency department with multiple complaints. She reports elevated blood pressure, headache, nausea, generalized body aches. I'll sign show elevated blood pressure. She reports not taking any of her blood pressure medications today secondary to a verbal altercation with her mother. Physical exam as above. Her neurological exam is intact. She reports this is not the worst of her life, not thunderclap in nature, not acute in onset. States it started gradually yesterday and continued this morning after becoming stressed out about a fight with her mother. We'll give clonidine, hydralazine. We'll get a CT head and baseline lab workup and EKG. We'll give Norco for pain. She is on this at home and is not driving today. Patient's blood pressure improved after taking medications. She is resting comfortably in the room.   Reports her headache is improved after taking her home dose Norco and having blood pressure improvement. Her CBC, CMP, troponin without signs of end organ damage. Her CT head is without acute abnormality. Patient appears nontoxic at this time. I do not suspect subarachnoid hemorrhage at this time and do not believe she needs an LP as her symptoms came on gradually, not the worst headache of her life, not thunderclap in nature and improved after blood pressure control. We'll discharge home with outpatient follow-up, instructions to take her blood pressure medications regularly, and follow-up with her primary care doctor in 1-2 days. She reports understanding and agrees with discharge plan. Return precautions given. CONSULTS:  None      FINAL IMPRESSION      1. Uncontrolled hypertension    2. Headache disorder    3.  Nausea          DISPOSITION/PLAN   DISPOSITION        PATIENT REFERRED TO:  Colt Chilel MD  [address] [address]  [phone]    Schedule an appointment as soon as possible for a visit in 2 days  As needed, If symptoms worsen      DISCHARGE MEDICATIONS:  New Prescriptions    ONDANSETRON (ZOFRAN ODT) 4 MG DISINTEGRATING TABLET    Take 1 tablet by mouth every 8 hours as needed for Nausea          (Please note that portions of this note were completed with a voice recognition program.  Efforts were made to edit the dictations but occasionally words are mis-transcribed.)    Cindy Ashton DO (electronically signed)  Attending Emergency Physician      Cindy Ashton DO  05/19/19 [id number]

## 2019-09-01 ENCOUNTER — HOSPITAL ENCOUNTER (EMERGENCY)
Age: 49
Discharge: HOME | End: 2019-09-01
Payer: MEDICARE

## 2019-09-01 ENCOUNTER — APPOINTMENT (OUTPATIENT)
Dept: GENERAL RADIOLOGY | Age: 49
End: 2019-09-01
Payer: MEDICARE

## 2019-09-01 VITALS
DIASTOLIC BLOOD PRESSURE: 89 MMHG | SYSTOLIC BLOOD PRESSURE: 150 MMHG | HEART RATE: 87 BPM | OXYGEN SATURATION: 100 % | RESPIRATION RATE: 14 BRPM | TEMPERATURE: 98.1 F

## 2019-09-01 DIAGNOSIS — S61.309A TRAUMATIC AVULSION OF NAIL PLATE OF FINGER, INITIAL ENCOUNTER: Primary | ICD-10-CM

## 2019-09-01 PROCEDURE — 6370000000 HC RX 637 (ALT 250 FOR IP): Performed by: PHYSICIAN ASSISTANT

## 2019-09-01 PROCEDURE — 4500000023 HC ED LEVEL 3 PROCEDURE

## 2019-09-01 PROCEDURE — 99283 EMERGENCY DEPT VISIT LOW MDM: CPT

## 2019-09-01 PROCEDURE — 73140 X-RAY EXAM OF FINGER(S): CPT

## 2019-09-01 RX ORDER — HYDROCODONE BITARTRATE AND ACETAMINOPHEN 5; 325 MG/1; MG/1
1 TABLET ORAL EVERY 6 HOURS PRN
Qty: 10 TABLET | Refills: 0 | Status: SHIPPED | OUTPATIENT
Start: 2019-09-01 | End: 2019-09-04

## 2019-09-01 RX ADMIN — Medication 1 EACH: at 17:25

## 2019-09-01 ASSESSMENT — PAIN DESCRIPTION - DESCRIPTORS: DESCRIPTORS: ACHING

## 2019-09-01 ASSESSMENT — PAIN DESCRIPTION - FREQUENCY: FREQUENCY: CONTINUOUS

## 2019-09-01 ASSESSMENT — PAIN DESCRIPTION - PAIN TYPE: TYPE: ACUTE PAIN

## 2019-09-01 ASSESSMENT — PAIN SCALES - GENERAL
PAINLEVEL_OUTOF10: 8
PAINLEVEL_OUTOF10: 6

## 2019-09-01 ASSESSMENT — PAIN DESCRIPTION - PROGRESSION: CLINICAL_PROGRESSION: GRADUALLY WORSENING

## 2019-09-01 ASSESSMENT — PAIN DESCRIPTION - ONSET: ONSET: GRADUAL

## 2019-09-01 ASSESSMENT — PAIN DESCRIPTION - ORIENTATION: ORIENTATION: RIGHT

## 2019-09-07 ASSESSMENT — ENCOUNTER SYMPTOMS
SORE THROAT: 0
EYE REDNESS: 0
NAUSEA: 0
CHOKING: 0
FACIAL SWELLING: 0
EYE DISCHARGE: 0
ABDOMINAL PAIN: 0
APNEA: 0
SHORTNESS OF BREATH: 0
BACK PAIN: 0
VOMITING: 0

## 2019-12-05 ENCOUNTER — HOSPITAL ENCOUNTER (EMERGENCY)
Age: 49
Discharge: HOME | End: 2019-12-05
Attending: EMERGENCY MEDICINE
Payer: MEDICARE

## 2019-12-05 VITALS
TEMPERATURE: 98.4 F | BODY MASS INDEX: 32.95 KG/M2 | SYSTOLIC BLOOD PRESSURE: 187 MMHG | RESPIRATION RATE: 20 BRPM | DIASTOLIC BLOOD PRESSURE: 123 MMHG | HEART RATE: 76 BPM | HEIGHT: 65 IN | WEIGHT: 197.75 LBS | OXYGEN SATURATION: 98 %

## 2019-12-05 PROCEDURE — 4500000002 HC ER NO CHARGE

## 2019-12-05 ASSESSMENT — PAIN DESCRIPTION - ONSET: ONSET: AWAKENED FROM SLEEP

## 2019-12-05 ASSESSMENT — PAIN DESCRIPTION - PROGRESSION: CLINICAL_PROGRESSION: GRADUALLY WORSENING

## 2019-12-05 ASSESSMENT — PAIN SCALES - GENERAL: PAINLEVEL_OUTOF10: 10

## 2019-12-05 ASSESSMENT — PAIN DESCRIPTION - PAIN TYPE: TYPE: CHRONIC PAIN

## 2019-12-05 ASSESSMENT — PAIN DESCRIPTION - FREQUENCY: FREQUENCY: CONTINUOUS

## 2019-12-05 ASSESSMENT — PAIN - FUNCTIONAL ASSESSMENT: PAIN_FUNCTIONAL_ASSESSMENT: INTOLERABLE, UNABLE TO DO ANY ACTIVE OR PASSIVE ACTIVITIES

## 2019-12-05 ASSESSMENT — PAIN DESCRIPTION - DESCRIPTORS: DESCRIPTORS: THROBBING;SHOOTING

## 2020-07-17 ENCOUNTER — OFFICE VISIT (OUTPATIENT)
Dept: ENT CLINIC | Age: 50
End: 2020-07-17
Payer: MEDICARE

## 2020-07-17 VITALS
HEIGHT: 65 IN | WEIGHT: 200 LBS | BODY MASS INDEX: 33.32 KG/M2 | TEMPERATURE: 97.2 F | SYSTOLIC BLOOD PRESSURE: 137 MMHG | DIASTOLIC BLOOD PRESSURE: 93 MMHG | HEART RATE: 97 BPM

## 2020-07-17 PROCEDURE — 31231 NASAL ENDOSCOPY DX: CPT | Performed by: OTOLARYNGOLOGY

## 2020-07-17 PROCEDURE — 99204 OFFICE O/P NEW MOD 45 MIN: CPT | Performed by: OTOLARYNGOLOGY

## 2020-07-17 RX ORDER — PREDNISONE 10 MG/1
TABLET ORAL
Qty: 30 TABLET | Refills: 0 | Status: SHIPPED | OUTPATIENT
Start: 2020-07-17 | End: 2021-05-11 | Stop reason: ALTCHOICE

## 2020-07-17 RX ORDER — LEVOFLOXACIN 500 MG/1
500 TABLET, FILM COATED ORAL DAILY
Qty: 14 TABLET | Refills: 0 | Status: SHIPPED | OUTPATIENT
Start: 2020-07-17 | End: 2020-07-31

## 2020-07-17 RX ORDER — FLUTICASONE PROPIONATE 50 MCG
1 SPRAY, SUSPENSION (ML) NASAL DAILY
Qty: 2 BOTTLE | Refills: 1 | Status: SHIPPED | OUTPATIENT
Start: 2020-07-17

## 2020-07-17 NOTE — PROGRESS NOTES
[facility]      Patient Name: Dayanara Ramirez  Medical Record Number:  <[id number]  Primary Care Physician:  Anam Del Valle MD  Date of Consultation: 2020    Chief Complaint: Right ear pain, sore throat, nasal congestion        HISTORY OF PRESENT ILLNESS  Lilian White is a(n) 53 y.o. female who presents for evaluation of right ear pain, sore throat and nasal congestion. The patient says that she started having a really painful right ear 2 to 3 weeks ago. She is had eardrops as well as 2 rounds of antibiotics. She thinks she had amoxicillin and doxycycline. She does not think that it made a significant difference in the ear pain. In addition of pain she says that her hearing is muffled on the right side. She also has pain running down the right side of her throat. She also cannot breathe well out of her nose and has fairly severe nasal congestion. She does not have a significant history of sinusitis, but has had ear infections in the past.  She is never had ear surgery or ear tubes as a child. She complains of voice changes with this as well. She is a smoker. She denies coughing up blood, trouble swallowing or cervical neck masses.       Patient Active Problem List   Diagnosis    Severe asthma without complication    Hypothyroidism    Chronic nonintractable headache    Fever    Tobacco use    Right hip pain    SIRS (systemic inflammatory response syndrome) (HCC)    Right hip joint effusion    Septic hip (HCC)    HTN (hypertension)    HLD (hyperlipidemia)    Familial hypercholesterolemia    Sepsis ([address]     Past Surgical History:   Procedure Laterality Date    BRAIN SURGERY      aneurysm clippings     SECTION      x2    CHOLECYSTECTOMY      FOOT SURGERY      HIP SURGERY Right     HYSTERECTOMY      NOSE SURGERY       Family History   Problem Relation Age of Onset    Hearing Loss Mother     Cancer Maternal Viridiana Whitten Hearing Loss Maternal Uncle     Hearing Loss Paternal Aunt     Cancer Paternal Grandmother      Social History     Socioeconomic History    Marital status: Single     Spouse name: Not on file    Number of children: Not on file    Years of education: Not on file    Highest education level: Not on file   Occupational History    Occupation: Homemaker   Social Needs    Financial resource strain: Not on file    Food insecurity     Worry: Not on file     Inability: Not on file   [employer] needs     Medical: Not on file     Non-medical: Not on file   Tobacco Use    Smoking status: Current Every Day Smoker     Packs/day: 0.50     Years: 20.00     Pack years: 10.00     Types: Cigarettes    Smokeless tobacco: Current User   Substance and Sexual Activity    Alcohol use: Yes     Comment: holidays and birthdays    Drug use: No    Sexual activity: Yes     Partners: Male   Lifestyle    Physical activity     Days per week: Not on file     Minutes per session: Not on file    Stress: Not on file   Relationships    Social connections     Talks on phone: Not on file     Gets together: Not on file     Attends Restorationist service: Not on file     Active member of club or organization: Not on file     Attends meetings of clubs or organizations: Not on file     Relationship status: Not on file    Intimate partner violence     Fear of current or ex partner: Not on file     Emotionally abused: Not on file     Physically abused: Not on file     Forced sexual activity: Not on file   Other Topics Concern    Not on file   Social History Narrative    Not on file       DRUG/FOOD ALLERGIES: Latex; Ace inhibitors; Ibuprofen; Iv [iodides]; Lisinopril; Morphine; Other; and Shellfish-derived products    CURRENT MEDICATIONS  Prior to Admission medications    Medication Sig Start Date End Date Taking?  Authorizing Provider   levoFLOXacin (LEVAQUIN) 500 MG tablet Take 1 tablet by mouth daily for 14 days 7/17/20 7/31/20 Yes Genet Grayson Yannick Sung MD   predniSONE (DELTASONE) 10 MG tablet Take 4 tabs/day for 3 days, then 3 tabs/day for 3 days then 2 tabs/day for 3 days then 1 tab/day for 3 days 7/17/20  Yes Luan Pena MD   fluticasone [facility]) 50 MCG/ACT nasal spray 1 spray by Each Nostril route daily 7/17/20  Yes Luan Pena MD   ondansetron (ZOFRAN ODT) 4 MG disintegrating tablet Take 1 tablet by mouth every 8 hours as needed for Nausea 5/19/19   Francisco Morris DO   diphenhydrAMINE (BENADRYL) 25 MG capsule Take 1-2 capsules by mouth every 6 hours as needed for Itching 6/9/18   Mary Push, APRN - CNP   methocarbamol (ROBAXIN) 500 MG tablet Take 500 mg by mouth 4 times daily    Historical Provider, MD   albuterol sulfate HFA (PROVENTIL HFA) 108 (90 Base) MCG/ACT inhaler Inhale 2 puffs into the lungs every 6 hours as needed for Wheezing or Shortness of Breath May substitute ProAir MDI 1/24/18   KAY Gaona   metoclopramide (REGLAN) 10 MG tablet Take 1 tablet by mouth 4 times daily WARNING:  May cause drowsiness. May impair ability to operate vehicles or machinery. Do not use in combination with alcohol. 1/10/18   Shanthi Song MD   losartan-hydrochlorothiazide (HYZAAR) 100-25 MG per tablet Take 1 tablet by mouth daily    Historical Provider, MD   ALPRAZolam Jacy Pennant) 0.5 MG tablet Take 0.5 mg by mouth 3 times daily as needed for Anxiety .     Historical Provider, MD   hydrALAZINE (APRESOLINE) 50 MG tablet Take 50 mg by mouth daily     Historical Provider, MD   metoprolol succinate (TOPROL XL) 100 MG extended release tablet Take 200 mg by mouth daily    Historical Provider, MD   atorvastatin (LIPITOR) 40 MG tablet Take 40 mg by mouth daily    Historical Provider, MD   levothyroxine (SYNTHROID) 50 MCG tablet Take 50 mcg by mouth Daily    Historical Provider, MD   promethazine (PHENERGAN) 25 MG tablet Take 25 mg by mouth every 8 hours as needed for Nausea    Historical Provider, MD   betamethasone dipropionate (DIPROLENE) 0.05 % cream Apply topically daily Apply topically 2 times daily. Historical Provider, MD   triamcinolone (KENALOG) 0.1 % cream Apply topically daily Apply topically 2 times daily. Historical Provider, MD   bisacodyl (DULCOLAX) 5 MG EC tablet Take 5 mg by mouth daily as needed for Constipation    Historical Provider, MD   HYDROcodone-acetaminophen (NORCO) 5-325 MG per tablet Take 1 tablet by mouth every 6 hours as needed for Pain    Historical Provider, MD   cloNIDine (CATAPRES) 0.3 MG tablet Take 0.3 mg by mouth three times daily    Historical Provider, MD   NIFEdipine (PROCARDIA XL) 90 MG extended release tablet Take 1 tablet by mouth daily 10/23/16   Alcides Johnston MD   omeprazole (PRILOSEC) 20 MG capsule Take 20 mg by mouth daily. Historical Provider, MD   zolpidem (AMBIEN) 5 MG tablet Take 10 mg by mouth nightly as needed for Sleep  . Historical Provider, MD       REVIEW OF SYSTEMS  The following systems were reviewed and revealed the following in addition to any already discussed in the HPI:    CONSTITUTIONAL: no weight loss, no fever, no night sweats, no chills  EYES: no vision changes, no blurry vision  EARS: Right otalgia, decreased hearing  NOSE: Nasal congestion  RESPIRATORY: Muffled voice  CV: no chest pain, no Peripheral vascular disease  HEME: No coagulation disorder, no Bleeding disorder  NEURO: no TIA or stroke-like symptoms  SKIN: No new rashes in the head and neck, no recent skin cancers  MOUTH: No new ulcers, no recent teeth infections  GASTROINTESTINAL: No diarrhea, stomach pain  PSYCH: No anxiety, no depression      PHYSICAL EXAM  BP (!) 137/93 (Site: Left Upper Arm, Position: Sitting, Cuff Size: Medium Adult)   Pulse 97   Temp 97.2 °F (36.2 °C) (Temporal)   Ht 5' 5\" (1.651 m)   Wt 200 lb (90.7 kg)   BMI 33.28 kg/m²     GENERAL: No Acute Distress, Alert and Oriented, mild hoarseness  EYES: EOMI, Anti-icteric  HENT:   Head: Normocephalic and atraumatic.    Face: Symmetric, facial nerve intact, no sinus tenderness   see below  Mouth/Oral Cavity:  normal lips, Uvula is midline, no mucosal lesions, no trismus, poor dentition, normal salivary quality/flow  Oropharynx/Larynx:  normal oropharynx, normal tonsils; see below  Nose:Normal external nasal appearance. Anterior rhinoscopy shows a S-shaped septal deviation with a caudal right deviation and a more leftward deviation posteriorly see below  NECK: Normal range of motion, no thyromegaly, trachea is midline, no lymphadenopathy, no neck masses, no crepitus  CHEST: Normal respiratory effort, no retractions, breathing comfortably  SKIN: No rashes, normal appearing skin, no evidence of skin lesions/tumors  Neuro:  cranial nerve II-XII intact; normal gait  Cardio:  no edema        PROCEDURE  Nasal endoscopy and laryngoscopy  Afrin and lidocaine were applied the bilateral nasal cavity. 0 degree scope was used to visualize the bilateral nasal cavity. The patient had diffuse purulent drainage in the bilateral nasal cavity. There was diffuse mucosal swelling as well. She had the noted right-sided caudal septal deviation with a more left deviation on the posterior aspect. I was able to pass the scope through the right nasal cavity. Nasopharynx appeared to be normal.  I do not see any abnormality of the right eustachian tube. The base of tongue and vallecula were normal.  There was some edema of the supraglottic and glottic airway without any obvious mass or lesion. Bilateral ear exam  The right ear was visualized binocular scope. Tympanic membrane was intact, but retracted with what appeared to be a serous middle ear effusion    On the left side there was an intact tympanic membrane with aerated middle ear        ASSESSMENT/PLAN  1. Acute sinusitis, recurrence not specified, unspecified location  This patient appears to have an acute sinusitis and an acute otitis media on the right side.   I am treating her with a two-week course of Levaquin as she is already had both amoxicillin and doxycycline. I am also giving her a prednisone taper. I am starting her on Flonase. I would like for her to do nasal saline irrigations as well. I will see her again in 3 weeks to see if this is resolved. I would like for her to get a hearing test as well given the middle ear effusion and hearing loss. She has ongoing pain with the effusion, we could consider an ear tube at that time. 2. Non-recurrent acute serous otitis media of right ear  As above    3. Dysphonia  This is likely related to of drainage from the sinusitis as well as smoking. I explained to the patient that there is no way that her voice will ever be normal she does not stop smoking. I have performed a head and neck physical exam personally or was physically present during the key or critical portions of the service. Medical Decision Making:   The following items were considered in medical decision making:  Independent review of images  Review / order clinical lab tests  Review / order radiology tests  Decision to obtain old records

## 2021-03-15 ENCOUNTER — HOSPITAL ENCOUNTER (EMERGENCY)
Age: 51
Discharge: HOME | End: 2021-03-15
Attending: EMERGENCY MEDICINE
Payer: MEDICARE

## 2021-03-15 VITALS
WEIGHT: 200 LBS | OXYGEN SATURATION: 99 % | SYSTOLIC BLOOD PRESSURE: 174 MMHG | BODY MASS INDEX: 33.28 KG/M2 | TEMPERATURE: 99.2 F | DIASTOLIC BLOOD PRESSURE: 106 MMHG | HEART RATE: 93 BPM | RESPIRATION RATE: 16 BRPM

## 2021-03-15 DIAGNOSIS — I10 ELEVATED BLOOD PRESSURE READING IN OFFICE WITH DIAGNOSIS OF HYPERTENSION: ICD-10-CM

## 2021-03-15 DIAGNOSIS — H92.02 LEFT EAR PAIN: ICD-10-CM

## 2021-03-15 DIAGNOSIS — J02.9 SORE THROAT: Primary | ICD-10-CM

## 2021-03-15 DIAGNOSIS — F17.200 CURRENT SMOKER: ICD-10-CM

## 2021-03-15 DIAGNOSIS — R49.0 HOARSE VOICE QUALITY: ICD-10-CM

## 2021-03-15 DIAGNOSIS — H66.90 ACUTE OTITIS MEDIA, UNSPECIFIED OTITIS MEDIA TYPE: ICD-10-CM

## 2021-03-15 LAB
ANION GAP SERPL CALCULATED.3IONS-SCNC: 15 MMOL/L (ref 3–16)
BASOPHILS ABSOLUTE: 0.1 K/UL (ref 0–0.2)
BASOPHILS RELATIVE PERCENT: 1 %
BUN BLDV-MCNC: 7 MG/DL (ref 7–20)
CALCIUM SERPL-MCNC: 9.9 MG/DL (ref 8.3–10.6)
CHLORIDE BLD-SCNC: 102 MMOL/L (ref 99–110)
CO2: 26 MMOL/L (ref 21–32)
CREAT SERPL-MCNC: 0.6 MG/DL (ref 0.6–1.1)
EOSINOPHILS ABSOLUTE: 0.4 K/UL (ref 0–0.6)
EOSINOPHILS RELATIVE PERCENT: 4.2 %
GFR AFRICAN AMERICAN: >60
GFR NON-AFRICAN AMERICAN: >60
GLUCOSE BLD-MCNC: 108 MG/DL (ref 70–99)
HCT VFR BLD CALC: 40.7 % (ref 36–48)
HEMOGLOBIN: 13.5 G/DL (ref 12–16)
LYMPHOCYTES ABSOLUTE: 3.1 K/UL (ref 1–5.1)
LYMPHOCYTES RELATIVE PERCENT: 33.6 %
MCH RBC QN AUTO: 26.3 PG (ref 26–34)
MCHC RBC AUTO-ENTMCNC: 33.1 G/DL (ref 31–36)
MCV RBC AUTO: 79.5 FL (ref 80–100)
MONOCYTES ABSOLUTE: 0.8 K/UL (ref 0–1.3)
MONOCYTES RELATIVE PERCENT: 9.2 %
NEUTROPHILS ABSOLUTE: 4.7 K/UL (ref 1.7–7.7)
NEUTROPHILS RELATIVE PERCENT: 52 %
PDW BLD-RTO: 15.1 % (ref 12.4–15.4)
PLATELET # BLD: 317 K/UL (ref 135–450)
PMV BLD AUTO: 8.9 FL (ref 5–10.5)
POTASSIUM REFLEX MAGNESIUM: 3.7 MMOL/L (ref 3.5–5.1)
RBC # BLD: 5.12 M/UL (ref 4–5.2)
SODIUM BLD-SCNC: 143 MMOL/L (ref 136–145)
WBC # BLD: 9.1 K/UL (ref 4–11)

## 2021-03-15 PROCEDURE — 96375 TX/PRO/DX INJ NEW DRUG ADDON: CPT

## 2021-03-15 PROCEDURE — 99284 EMERGENCY DEPT VISIT MOD MDM: CPT

## 2021-03-15 PROCEDURE — 6370000000 HC RX 637 (ALT 250 FOR IP): Performed by: EMERGENCY MEDICINE

## 2021-03-15 PROCEDURE — 2580000003 HC RX 258: Performed by: EMERGENCY MEDICINE

## 2021-03-15 PROCEDURE — 80048 BASIC METABOLIC PNL TOTAL CA: CPT

## 2021-03-15 PROCEDURE — 96374 THER/PROPH/DIAG INJ IV PUSH: CPT

## 2021-03-15 PROCEDURE — 85025 COMPLETE CBC W/AUTO DIFF WBC: CPT

## 2021-03-15 PROCEDURE — 36415 COLL VENOUS BLD VENIPUNCTURE: CPT

## 2021-03-15 PROCEDURE — 6360000002 HC RX W HCPCS: Performed by: EMERGENCY MEDICINE

## 2021-03-15 RX ORDER — ACETAMINOPHEN 325 MG/1
650 TABLET ORAL ONCE
Status: COMPLETED | OUTPATIENT
Start: 2021-03-15 | End: 2021-03-15

## 2021-03-15 RX ORDER — KETOROLAC TROMETHAMINE 30 MG/ML
15 INJECTION, SOLUTION INTRAMUSCULAR; INTRAVENOUS ONCE
Status: COMPLETED | OUTPATIENT
Start: 2021-03-15 | End: 2021-03-15

## 2021-03-15 RX ORDER — DEXAMETHASONE SODIUM PHOSPHATE 4 MG/ML
10 INJECTION, SOLUTION INTRA-ARTICULAR; INTRALESIONAL; INTRAMUSCULAR; INTRAVENOUS; SOFT TISSUE EVERY 6 HOURS
Status: DISCONTINUED | OUTPATIENT
Start: 2021-03-15 | End: 2021-03-16 | Stop reason: HOSPADM

## 2021-03-15 RX ORDER — AMOXICILLIN AND CLAVULANATE POTASSIUM 875; 125 MG/1; MG/1
1 TABLET, FILM COATED ORAL EVERY 12 HOURS SCHEDULED
Status: DISCONTINUED | OUTPATIENT
Start: 2021-03-15 | End: 2021-03-16 | Stop reason: HOSPADM

## 2021-03-15 RX ORDER — AMOXICILLIN AND CLAVULANATE POTASSIUM 875; 125 MG/1; MG/1
1 TABLET, FILM COATED ORAL 2 TIMES DAILY
Qty: 19 TABLET | Refills: 0 | Status: SHIPPED | OUTPATIENT
Start: 2021-03-15 | End: 2021-03-25

## 2021-03-15 RX ORDER — 0.9 % SODIUM CHLORIDE 0.9 %
1000 INTRAVENOUS SOLUTION INTRAVENOUS ONCE
Status: COMPLETED | OUTPATIENT
Start: 2021-03-15 | End: 2021-03-15

## 2021-03-15 RX ADMIN — SODIUM CHLORIDE 1000 ML: 9 INJECTION, SOLUTION INTRAVENOUS at 21:28

## 2021-03-15 RX ADMIN — ACETAMINOPHEN 650 MG: 325 TABLET ORAL at 21:27

## 2021-03-15 RX ADMIN — KETOROLAC TROMETHAMINE 15 MG: 30 INJECTION, SOLUTION INTRAMUSCULAR at 21:28

## 2021-03-15 RX ADMIN — DEXAMETHASONE SODIUM PHOSPHATE 10 MG: 4 INJECTION, SOLUTION INTRAMUSCULAR; INTRAVENOUS at 21:28

## 2021-03-15 RX ADMIN — AMOXICILLIN AND CLAVULANATE POTASSIUM 1 TABLET: 875; 125 TABLET, FILM COATED ORAL at 22:41

## 2021-03-15 ASSESSMENT — PAIN SCALES - GENERAL
PAINLEVEL_OUTOF10: 8
PAINLEVEL_OUTOF10: 5
PAINLEVEL_OUTOF10: 8

## 2021-03-15 ASSESSMENT — ENCOUNTER SYMPTOMS
SHORTNESS OF BREATH: 0
SORE THROAT: 1
TROUBLE SWALLOWING: 1
VOICE CHANGE: 1
EYE PAIN: 0
SINUS PRESSURE: 0
COUGH: 1
ABDOMINAL PAIN: 0

## 2021-03-15 ASSESSMENT — PAIN DESCRIPTION - PROGRESSION: CLINICAL_PROGRESSION: GRADUALLY IMPROVING

## 2021-03-16 ENCOUNTER — CARE COORDINATION (OUTPATIENT)
Dept: CARE COORDINATION | Age: 51
End: 2021-03-16

## 2021-03-16 NOTE — ED PROVIDER NOTES
[address] [facility] ENCOUNTER      Pt Name: Ngoc Alicea  MRN: [id number]  [place] 1970  Date of evaluation: 3/15/2021  Provider: Roxy Brown MD    CHIEF COMPLAINT       Chief Complaint   Patient presents with    Pharyngitis     HISTORY OF PRESENT ILLNESS  (Location/Symptom, Timing/Onset,Context/Setting, Quality, Duration, Modifying Factors, Severity). Note limiting factors. Ngoc Alicea is a 53 y.o. female who presents to the emergency department secondary to concern for left ear pain associated with sore throat. She reports she has been having symptoms ongoing for a few days though worse over the last couple of days. She has had fevers as high as 100.5. At home she has taken 800 mg of Motrin as well as yesterday she took one hydrocodone though today she states she did not take anything due to pain with swallowing. She has been having a cough though states coughing cause a lot of discomfort so she has tried very hard today not to cough. No known sick contacts. Has been eating and drinking less than usual because of the pain. Pain in her throat is worse with swallowing but also hurts when she touches it on the outside. Voice has been hoarse for a couple of days. Describes the pain as constant and an ache though intermittently sharp especially with swallowing/palpation. Past medical history noted below, significant for arthritis, asthma, heart failure, COPD, DVT of upper extremity, gout, headaches, HTN, thyroid disease. She does currently smoke half a pack per day, she states she is interested in quitting. Aside from what is stated above denies any other symptoms or modifying factors. Nursing Notes reviewed. REVIEW OF SYSTEMS  (2-9 systems for level 4, 10 or more for level 5)   Review of Systems   Constitutional: Positive for appetite change (decreased) and fever.    HENT: Positive for ear pain (left), sore throat, trouble swallowing and voice change. Negative for dental problem, hearing loss and sinus pressure. Eyes: Negative for pain. Respiratory: Positive for cough. Negative for shortness of breath. Cardiovascular: Negative for leg swelling. Gastrointestinal: Negative for abdominal pain. Genitourinary: Negative. Musculoskeletal: Negative for gait problem. Skin: Negative for rash. Neurological: Negative for headaches. PAST MEDICAL HISTORY     Past Medical History:   Diagnosis Date    Arthritis     Asthma     [facility]est[facility]     COPD (chronic obstructive pulmonary disease) ([facility]     DVT of upper extremity (deep vein thrombosis) (HCC)     Gout     Headache(784.0)     Hypertension     Thyroid disease        SURGICALHISTORY       Past Surgical History:   Procedure Laterality Date    BRAIN SURGERY      aneurysm clippings     SECTION      x2    CHOLECYSTECTOMY      FOOT SURGERY      HIP SURGERY Right     HYSTERECTOMY      NOSE SURGERY       CURRENT MEDICATIONS       Previous Medications    ALBUTEROL SULFATE HFA (PROVENTIL HFA) 108 (90 BASE) MCG/ACT INHALER    Inhale 2 puffs into the lungs every 6 hours as needed for Wheezing or Shortness of Breath May substitute ProAir MDI    ALPRAZOLAM (XANAX) 0.5 MG TABLET    Take 0.5 mg by mouth 3 times daily as needed for Anxiety . ATORVASTATIN (LIPITOR) 40 MG TABLET    Take 40 mg by mouth daily    BETAMETHASONE DIPROPIONATE (DIPROLENE) 0.05 % CREAM    Apply topically daily Apply topically 2 times daily.     BISACODYL (DULCOLAX) 5 MG EC TABLET    Take 5 mg by mouth daily as needed for Constipation    CLONIDINE (CATAPRES) 0.3 MG TABLET    Take 0.3 mg by mouth three times daily    DIPHENHYDRAMINE (BENADRYL) 25 MG CAPSULE    Take 1-2 capsules by mouth every 6 hours as needed for Itching    FLUTICASONE (FLONASE) 50 MCG/ACT NASAL SPRAY    1 spray by Each Nostril route daily    HYDRALAZINE (APRESOLINE) 50 MG TABLET    Take 50 mg by mouth daily HYDROCODONE-ACETAMINOPHEN (NORCO) 5-325 MG PER TABLET    Take 1 tablet by mouth every 6 hours as needed for Pain    LEVOTHYROXINE (SYNTHROID) 50 MCG TABLET    Take 50 mcg by mouth Daily    LOSARTAN-HYDROCHLOROTHIAZIDE (HYZAAR) 100-25 MG PER TABLET    Take 1 tablet by mouth daily    METHOCARBAMOL (ROBAXIN) 500 MG TABLET    Take 500 mg by mouth 4 times daily    METOCLOPRAMIDE (REGLAN) 10 MG TABLET    Take 1 tablet by mouth 4 times daily WARNING:  May cause drowsiness. May impair ability to operate vehicles or machinery. Do not use in combination with alcohol. METOPROLOL SUCCINATE (TOPROL XL) 100 MG EXTENDED RELEASE TABLET    Take 200 mg by mouth daily    NIFEDIPINE (PROCARDIA XL) 90 MG EXTENDED RELEASE TABLET    Take 1 tablet by mouth daily    OMEPRAZOLE (PRILOSEC) 20 MG CAPSULE    Take 20 mg by mouth daily. ONDANSETRON (ZOFRAN ODT) 4 MG DISINTEGRATING TABLET    Take 1 tablet by mouth every 8 hours as needed for Nausea    PREDNISONE (DELTASONE) 10 MG TABLET    Take 4 tabs/day for 3 days, then 3 tabs/day for 3 days then 2 tabs/day for 3 days then 1 tab/day for 3 days    PROMETHAZINE (PHENERGAN) 25 MG TABLET    Take 25 mg by mouth every 8 hours as needed for Nausea    TRIAMCINOLONE (KENALOG) 0.1 % CREAM    Apply topically daily Apply topically 2 times daily. ZOLPIDEM (AMBIEN) 5 MG TABLET    Take 10 mg by mouth nightly as needed for Sleep  .       ALLERGIES     Latex, Ace inhibitors, Ibuprofen, Iv [iodides], Lisinopril, Morphine, Other, and Shellfish-derived products  FAMILY HISTORY       Family History   Problem Relation Age of Onset    Hearing Loss Mother     Cancer Maternal Aunt     Hearing Loss Maternal Uncle     Hearing Loss Paternal Aunt     Cancer Paternal Grandmother      SOCIAL HISTORY       Social History     Socioeconomic History    Marital status: Single     Spouse name: None    Number of children: None    Years of education: None    Highest education level: None   Occupational History    Occupation: Homemaker   Social Needs    Financial resource strain: None    Food insecurity     Worry: None     Inability: None    Transportation needs     Medical: None     Non-medical: None   Tobacco Use    Smoking status: Current Every Day Smoker     Packs/day: 0.50     Years: 20.00     Pack years: 10.00     Types: Cigarettes    Smokeless tobacco: Current User   Substance and Sexual Activity    Alcohol use: Yes     Comment: holidays and birthdays    Drug use: No    Sexual activity: Yes     Partners: Male   Lifestyle    Physical activity     Days per week: None     Minutes per session: None    Stress: None   Relationships    Social connections     Talks on phone: None     Gets together: None     Attends Zoroastrianism service: None     Active member of club or organization: None     Attends meetings of clubs or organizations: None     Relationship status: None    Intimate partner violence     Fear of current or ex partner: None     Emotionally abused: None     Physically abused: None     Forced sexual activity: None   Other Topics Concern    None   Social History Narrative    None     SCREENINGS    [place] Coma Scale  Eye Opening: Spontaneous  Best Verbal Response: Oriented  Best Motor Response: Obeys commands  [place] Coma Scale Score: 15    PHYSICAL EXAM  (up to 7 for level 4, 8 or more for level 5)   INITIAL VITALS: BP: (!) 176/104, Temp: 99.2 °F (37.3 °C), Pulse: 92, Resp: 16, SpO2: 99 %   Physical Exam  Constitutional:       Appearance: She is obese. HENT:      Head: Normocephalic and atraumatic. Jaw: There is normal jaw occlusion. Right Ear: External ear normal. No laceration or drainage. No middle ear effusion. There is no impacted cerumen. No foreign body. No mastoid tenderness. No hemotympanum. Left Ear: External ear normal. No laceration, drainage or tenderness. A middle ear effusion is present. There is no impacted cerumen. No foreign body. No mastoid tenderness.  No hemotympanum. Ears:      Comments: Both canals are erythematous, left TM with effusion noted     Nose: No congestion or rhinorrhea. Mouth/Throat:      Mouth: Mucous membranes are moist.   Eyes:      General: No scleral icterus. Conjunctiva/sclera: Conjunctivae normal.      Pupils: Pupils are equal, round, and reactive to light. Neck:      Musculoskeletal: Normal range of motion. Thyroid: No thyromegaly. Trachea: No tracheal deviation. Cardiovascular:      Rate and Rhythm: Normal rate. Pulses: Normal pulses. Pulmonary:      Effort: Pulmonary effort is normal. No respiratory distress. Musculoskeletal:      Right lower leg: No edema. Left lower leg: No edema. Lymphadenopathy:      Cervical: Cervical adenopathy (tender to palpation) present. Skin:     General: Skin is warm and dry. Capillary Refill: Capillary refill takes 2 to 3 seconds. Neurological:      General: No focal deficit present. Mental Status: She is alert and oriented to person, place, and time.    Psychiatric:         Mood and Affect: Mood normal.         Behavior: Behavior normal.       DIAGNOSTIC RESULTS     RADIOLOGY:   Interpretation per Radiologist below, if available at the time of this note:  No orders to display     LABS:  Labs Reviewed   CBC WITH AUTO DIFFERENTIAL - Abnormal; Notable for the following components:       Result Value    MCV 79.5 (*)     All other components within normal limits    Narrative:     Performed at:  [facility]  [address] [facility]   Phone [phone]   BASIC METABOLIC PANEL W/ REFLEX TO MG FOR LOW K - Abnormal; Notable for the following components:    Glucose 108 (*)     All other components within normal limits    Narrative:     Performed at:  [facility]) [facility]  [address] [facility]   Phone [phone]       All other labs were within normal range or not returned as of this dictation. EMERGENCY DEPARTMENT COURSE and DIFFERENTIAL DIAGNOSIS/MDM:   Patient was given the following medications:  Orders Placed This Encounter   Medications    Dexamethasone Sodium Phosphate injection 10 mg    ketorolac (TORADOL) injection 15 mg    acetaminophen (TYLENOL) tablet 650 mg    0.9 % sodium chloride bolus    amoxicillin-clavulanate (AUGMENTIN) 875-125 MG per tablet 1 tablet     Order Specific Question:   Antimicrobial Indications     Answer: Other     Order Specific Question:   Other Abx Indication     Answer:   ear infection    amoxicillin-clavulanate (AUGMENTIN) 875-125 MG per tablet     Sig: Take 1 tablet by mouth 2 times daily for 10 days Received first dose in ED     Dispense:  19 tablet     Refill:  0     CONSULTS:  None  INITIAL VITALS: BP: (!) 176/104, Temp: 99.2 °F (37.3 °C), Pulse: 92, Resp: 16, SpO2: 99 %   RECENT VITALS:  BP: (!) 174/106,Temp: 99.2 °F (37.3 °C), Pulse: 93, Resp: 16, SpO2: 99 %     Melissa Mackenzie is a 53 y.o. female who presents to the emergency department secondary to concern for left ear pain associated with sore throat, hoarse voice, decreased PO intake and pain to her trachea. On arrival she is awake, alert, oriented. Vitals notable for blood pressure 176/104, temperature elevation 99.2, otherwise hemodynamically stable and within normal limits. She does state she has not taken her blood pressure medicine today. On exam she does have what looks like some separation to her left TM with erythematous canals bilaterally. No active discharge from her ear canals. Her posterior oropharynx is clear without any erythema, exudates, swelling noted. She does have a hoarse voice quality and significant tenderness palpation along the trachea. She does have cervical lymphadenopathy that is tender to palpation as well. Her thyroid is not enlarged. There are no masses. She has no trouble controlling her secretions.  No trismus. No erythema along her neck. No intraoral lesions or signs of infection. Tongue bed soft. \A peripheral IV was placed, labs were ordered along with Toradol, Decadron, Tylenol, and IV fluids. As she has not had any medications for her symptoms today I plan on reassessing her after symptomatic treatment to assess her ability to swallow as well as tenderness to palpation of her anterior neck. Labs show no significant abnormalities. On reassessment she reported feeling better. She was able to PO challenge. No new symptoms. She continued to have mild tenderness with palpation of cervical lymphadenopathy though her tracheal tenderness had improved. She has had a significant amount of CT scans in the past and we discussed risks/benefits of CT scan imaging versus monitoring of symptoms. At this time she preferred to monitor symptoms which I feel is very reasonable. We also discussed importance of taking her prescribed medications including her BP meds which she plans to take when she gets home. We also encouraged her to continue to think about smoking cessation and discussed ways she could potentially quit. She was able to express understanding of importance of following up with primary care as well as return precautions prior to discharge. FINAL IMPRESSION      1. Sore throat    2. Hoarse voice quality    3. Left ear pain    4. Acute otitis media, unspecified otitis media type    5. Current smoker    6.  Elevated blood pressure reading in office with diagnosis of hypertension        DISPOSITION/PLAN   DISPOSITION        PATIENT REFERRED TO:  Halina Wagoner MD  [facility] [address] [address] [phone]    Schedule an appointment as soon as possible for a visit   For follow up appointment      DISCHARGE MEDICATIONS:  New Prescriptions    AMOXICILLIN-CLAVULANATE (AUGMENTIN) 875-125 MG PER TABLET    Take 1 tablet by mouth 2 times daily for 10 days Received first dose in ED            (Please note that portions of this note were completed with a voice recognition program. Efforts were made to edit the dictations but occasionally words are mis-transcribed.)    Trinh Duarte MD (electronically signed)  Attending Emergency Physician      Trinh Duarte MD  03/15/21 [id number]

## 2021-03-16 NOTE — CARE COORDINATION
ACM contacted Niecy Shepard to follow up on [facility] ED visit on 3/15/2021 dx: Sore throatHoarse voice qualityLeft ear painAcute otitis media, unspecified otitis media type  Current smoker,Elevated blood pressure reading in office with diagnosis of hypertension  AVS:  Your work up today is notable for an ear infection, you were started on  antibiotics. Your sore throat may be related to your ear infection. Continue taking motrin/tylenol at home. You can also use honey to help  with cough suppression. Follow up with your primary care this week. Return to ED for any new or worsening symptoms or concerns. Prescribed:amoxicillin-clavulanate  amoxicillin-clavulanate (AUGMENTIN) 875-125 MG per tablet  Take 1 tablet by mouth 2 times daily for 10 days Received first dose in ED     ACM spoke with Niecy Shepard today. She is c/o sore throat and hurts to talk. She feels that her throat is sore to the touch. She did have vomiting during the night but it has subsided. She is c/o weakness. She has not been able to eat. She is hydrating. Made recommendations for hydration. She has not taken her temperature but denies sweats and chills. Niecy Shepard feels that her ear pain has improved. Encouraged to follow up with PCP if she started vomiting again. Advised to call PCP if symptoms did not continue to improve. Niecy Shepard has started Augmentin this morning. She has NKA. Patient contacted regarding Jaspal Haley. Discussed COVID-19 related testing which was not done at this time. Test results were not done. Patient informed of results, if available? No    Ambulatory Care Manager contacted the patient by telephone to perform post discharge assessment. Call within 2 business days of discharge: Yes. Verified name and  with patient as identifiers. Provided introduction to self, and explanation of the CTN/ACM role, and reason for call due to risk factors for infection and/or exposure to COVID-19.      Symptoms reviewed with patient who verbalized the following symptoms: vomiting, no new symptoms, no worsening symptoms and weakness. Due to no new or worsening symptoms encounter was not routed to provider for escalation. Discussed follow-up appointments. If no appointment was previously scheduled, appointment scheduling offered: Yes  [facility] follow up appointment(s): No future appointments. Non-[facility] follow up appointment(s):     Non-face-to-face services provided:  Reviewed and followed up on pending diagnostic tests and treatments-reviewed labs  Education of patient/family/caregiver/guardian to support self-management-encouraged hydration and symptom monitoring . provided follow up plan  Assessment and support for treatment adherence and medication management-explained purpose of meds and directions to completely finish     Advance Care Planning:   Does patient have an Advance Directive:  not on file. Patient has following risk factors of: asthma. ACM reviewed discharge instructions, medical action plan and red flags such as increased shortness of breath, increasing fever and signs of decompensation with patient who verbalized understanding. Discussed exposure protocols and quarantine with CDC Guidelines What to do if you are sick with coronavirus disease 2019.  Patient was given an opportunity for questions and concerns. The patient agrees to contact the [facility] exposure line [phone] PCP office for questions related to their healthcare. [facility] provided contact information for future needs. Reviewed and educated patient on any new and changed medications related to discharge diagnosis     Was patient discharged with a pulse oximeter? No Discussed and confirmed pulse oximeter discharge instructions and when to notify provider or seek emergency care.      Patient/family/caregiver given information for [facility] and agrees to enroll no  Patient's preferred e-mail:    Patient's preferred phone number:   Based on Loop alert triggers, patient will be contacted by nurse care manager for worsening symptoms. pt prefers to follow up with her PCP. based on severity of symptoms and risk factors.

## 2021-03-17 ENCOUNTER — CARE COORDINATION (OUTPATIENT)
Dept: CARE COORDINATION | Age: 51
End: 2021-03-17

## 2021-03-17 NOTE — CARE COORDINATION
HIRA spoke with Kandis Ashford today. She reports that she is feeling better. Her voice is starting to come back. She had a low grade fever last night which was relieved with Tylenol. She has been able to eat. Denies vomiting. Still has productive cough. She is taking Augmentin. Plans to  cough medication. Encouraged follow up with PCP if she doesn't continue to improve.

## 2021-05-11 ENCOUNTER — APPOINTMENT (OUTPATIENT)
Dept: GENERAL RADIOLOGY | Age: 51
End: 2021-05-11
Payer: MEDICARE

## 2021-05-11 ENCOUNTER — HOSPITAL ENCOUNTER (EMERGENCY)
Age: 51
Discharge: HOME | End: 2021-05-11
Attending: STUDENT IN AN ORGANIZED HEALTH CARE EDUCATION/TRAINING PROGRAM
Payer: MEDICARE

## 2021-05-11 VITALS
HEART RATE: 78 BPM | OXYGEN SATURATION: 99 % | HEIGHT: 65 IN | BODY MASS INDEX: 32.99 KG/M2 | WEIGHT: 198 LBS | DIASTOLIC BLOOD PRESSURE: 110 MMHG | TEMPERATURE: 98.3 F | SYSTOLIC BLOOD PRESSURE: 169 MMHG | RESPIRATION RATE: 19 BRPM

## 2021-05-11 DIAGNOSIS — M19.032 ARTHRITIS OF LEFT WRIST: ICD-10-CM

## 2021-05-11 DIAGNOSIS — S63.502A LEFT WRIST SPRAIN, INITIAL ENCOUNTER: Primary | ICD-10-CM

## 2021-05-11 PROCEDURE — 29125 APPL SHORT ARM SPLINT STATIC: CPT

## 2021-05-11 PROCEDURE — 99283 EMERGENCY DEPT VISIT LOW MDM: CPT

## 2021-05-11 PROCEDURE — 6370000000 HC RX 637 (ALT 250 FOR IP): Performed by: STUDENT IN AN ORGANIZED HEALTH CARE EDUCATION/TRAINING PROGRAM

## 2021-05-11 PROCEDURE — 73110 X-RAY EXAM OF WRIST: CPT

## 2021-05-11 RX ORDER — ACETAMINOPHEN 500 MG
1000 TABLET ORAL 3 TIMES DAILY PRN
Qty: 180 TABLET | Refills: 0 | Status: SHIPPED | OUTPATIENT
Start: 2021-05-11

## 2021-05-11 RX ORDER — ACETAMINOPHEN 500 MG
1000 TABLET ORAL ONCE
Status: COMPLETED | OUTPATIENT
Start: 2021-05-11 | End: 2021-05-11

## 2021-05-11 RX ORDER — PREDNISONE 50 MG/1
50 TABLET ORAL DAILY
Qty: 5 TABLET | Refills: 0 | Status: SHIPPED | OUTPATIENT
Start: 2021-05-11 | End: 2021-05-16

## 2021-05-11 RX ADMIN — ACETAMINOPHEN 1000 MG: 500 TABLET ORAL at 21:56

## 2021-05-11 RX ADMIN — PREDNISONE 50 MG: 20 TABLET ORAL at 21:56

## 2021-05-11 ASSESSMENT — PAIN SCALES - GENERAL: PAINLEVEL_OUTOF10: 10

## 2021-05-12 NOTE — ED PROVIDER NOTES
[facility] ENCOUNTER      Pt Name: Nayeli Long  MRN: [id number]  [place] 1970  Date of evaluation: 5/11/2021  Provider: Tad Flowers MD    [address] [place] [address]       Chief Complaint   Patient presents with    Wrist Injury     left wrist injured x4 days ago after dog pulled her with leash         HISTORY OF PRESENT ILLNESS   (Location/Symptom, Timing/Onset,Context/Setting, Quality, Duration, Modifying Factors, Severity)  Note limiting factors. Nayeli Long is a 49 y.o. female with PMHx of inflammatory arthritis according to the patient, p/w L wrist pain. Onset gradual, patient states that she was having a usual arthritis flare for her starting 4 days ago that was exacerbated by an injury she sustained while walking her dog 2 days ago. She states that she was walking her dog 2 days ago and the dog pulled on the leash, causing pain in her left wrist and hand to worsen. Denies falls during this event. She denies fevers, her injury is associated with decreased range of motion of the left wrist and pain in the distribution of the anatomic snuffbox, thinks that she sprained it on top of her usual arthritis flare. Denies focal sensory loss, focal weakness. Exacerbated by movement and partially alleviated by immobilization. Symptoms not otherwise alleviated or exacerbated by other factors. NursingNotes were reviewed. REVIEW OF SYSTEMS    (2-9 systems for level 4, 10 or more for level 5)       Constitutional: No fever or chills. Eye: No visual disturbances. No eye pain. Ear/Nose/Mouth/Throat: No nasal congestion. No sore throat. Respiratory: No cough, No shortness of breath, No sputum production. Cardiovascular: No chest pain. No palpitations. Gastrointestinal: No abdominal pain. No nausea or vomiting  Genitourinary: No dysuria. No hematuria. Hematology/Lymphatics: No bleeding or bruising tendency. Immunologic: No malaise.  No swollen glands. Musculoskeletal: No back pain. + joint pain as in HPI. Integumentary: No rash. No abrasions. Neurologic: No headache. No focal numbness or weakness. PAST MEDICAL HISTORY     Past Medical History:   Diagnosis Date    Arthritis     Asthma     [facility]     COPD ([facility]ronic obstructive pulmonary disease) [facility]     DVT of upper extremity (deep vein thrombosis) ([facility]     Gout     Headache(784.0)     Hypertension     Thyroid disease          SURGICALHISTORY       Past Surgical History:   Procedure Laterality Date    BRAIN SURGERY      aneurysm clippings     SECTION      x2    CHOLECYSTECTOMY      FOOT SURGERY      HIP SURGERY Right     HYSTERECTOMY      NOSE SURGERY           CURRENT MEDICATIONS       Discharge Medication List as of 2021  9:51 PM      CONTINUE these medications which have NOT CHANGED    Details   fluticasone (FLONASE) 50 MCG/ACT nasal spray 1 spray by Each Nostril route daily, Disp-2 Bottle,R-1Normal      ondansetron (ZOFRAN ODT) 4 MG disintegrating tablet Take 1 tablet by mouth every 8 hours as needed for Nausea, Disp-20 tablet, R-0Print      diphenhydrAMINE (BENADRYL) 25 MG capsule Take 1-2 capsules by mouth every 6 hours as needed for Itching, Disp-20 capsule, R-0Print      methocarbamol (ROBAXIN) 500 MG tablet Take 500 mg by mouth 4 times dailyHistorical Med      albuterol sulfate HFA (PROVENTIL HFA) 108 (90 Base) MCG/ACT inhaler Inhale 2 puffs into the lungs every 6 hours as needed for Wheezing or Shortness of Breath May substitute ProAir MDI, Disp-1 Inhaler, R-2Print      metoclopramide (REGLAN) 10 MG tablet Take 1 tablet by mouth 4 times daily WARNING:  May cause drowsiness. May impair ability to operate vehicles or machinery.   Do not use in combination with alcohol., Disp-20 tablet, R-0Print      losartan-hydrochlorothiazide (HYZAAR) 100-25 MG per tablet Take 1 tablet by mouth dailyHistorical Med      ALPRAZolam (XANAX) 0.5 MG tablet Take 0.5 mg by mouth 3 times daily as needed for Anxiety . Historical Med      hydrALAZINE (APRESOLINE) 50 MG tablet Take 50 mg by mouth daily Historical Med      metoprolol succinate (TOPROL XL) 100 MG extended release tablet Take 200 mg by mouth dailyHistorical Med      atorvastatin (LIPITOR) 40 MG tablet Take 40 mg by mouth dailyHistorical Med      levothyroxine (SYNTHROID) 50 MCG tablet Take 50 mcg by mouth DailyHistorical Med      promethazine (PHENERGAN) 25 MG tablet Take 25 mg by mouth every 8 hours as needed for NauseaHistorical Med      betamethasone dipropionate (DIPROLENE) 0.05 % cream Apply topically daily Apply topically 2 times daily. , Topical, DAILY, Historical Med      triamcinolone (KENALOG) 0.1 % cream Apply topically daily Apply topically 2 times daily. , Topical, DAILY, Historical Med      bisacodyl (DULCOLAX) 5 MG EC tablet Take 5 mg by mouth daily as needed for ConstipationHistorical Med      HYDROcodone-acetaminophen (NORCO) 5-325 MG per tablet Take 1 tablet by mouth every 6 hours as needed for Pain      cloNIDine (CATAPRES) 0.3 MG tablet Take 0.3 mg by mouth three times daily      NIFEdipine (PROCARDIA XL) 90 MG extended release tablet Take 1 tablet by mouth daily, Disp-30 tablet, R-3      omeprazole (PRILOSEC) 20 MG capsule Take 20 mg by mouth daily. zolpidem (AMBIEN) 5 MG tablet Take 10 mg by mouth nightly as needed for Sleep  . Historical Med             ALLERGIES     Latex, Ace inhibitors, Ibuprofen, Iv [iodides], Lisinopril, Morphine, Other, and Shellfish-derived products    FAMILY HISTORY       Family History   Problem Relation Age of Onset    Hearing Loss Mother     Cancer Maternal Aunt     Hearing Loss Maternal Uncle     Hearing Loss Paternal Aunt     Cancer Paternal Grandmother           SOCIAL HISTORY       Social History     Socioeconomic History    Marital status: Single     Spouse name: None    Number of children: None    Years of education: None    Highest does allow ROM), tenderness, bony tenderness and swelling (slight). She exhibits no effusion (none palpable), no crepitus, no deformity and no laceration. Left hand: She exhibits bony tenderness (within the anatomic snuffbox). She exhibits normal range of motion, normal capillary refill, no deformity and no swelling. Normal sensation noted. Normal strength noted. Integumentary: Warm, Dry. No overlying erythema, no wounds. Neurologic: Alert and appropriate for age. Left upper extremity reveals sensation intact to light touch R/M/U. AIN/PIN/U 5/5 strength. No focal deficits. Psychiatric: Cooperative. DIAGNOSTIC RESULTS         RADIOLOGY:   Non-plain filmimages such as CT, Ultrasound and MRI are read by the radiologist. [place] Wells radiographic images are visualized and preliminarily interpreted by the emergency physician with the below findings:      Interpretation per the Radiologist below, if available at the time ofthis note:    XR WRIST LEFT (MIN 3 VIEWS)   Final Result   No significant finding of the left wrist.               EMERGENCY DEPARTMENT COURSE and DIFFERENTIAL DIAGNOSIS/MDM:   Vitals:    Vitals:    21 2018 21 2102 21 2200   BP: (!) 186/119 (!) 176/114 (!) 169/110   Pulse: 90 82 78   Resp: 18 18 19   Temp: 97.9 °F (36.6 °C)  98.3 °F (36.8 °C)   TempSrc: Oral  Oral   SpO2: 98% 98% 99%   Weight: 198 lb (89.8 kg)     Height: 5' 5\" (1.651 m)           Medical decision makin yo F with PMHx of inflammatory arthritis according to the patient, per chart review, does have history of ?septic hip in 2017 but patient states that she was having a usual arthritis flare for her starting 4 days ago that was exacerbated by an injury she sustained while walking her dog. She states that she was walking her dog 2 days ago and the dog pulled on the leash, causing pain in her left wrist and hand to worsen.   She denies fevers, her injury is associated with decreased range of motion, thinks that she sprained it on top of her usual arthritis flare. She has no overlying erythema, she does allow partial range of motion of the joint and she is afebrile, do not think septic arthritis, likely exacerbation of patient's underlying polyarticular inflammatory arthritis that at this point in time does not have an established etiology, per the patient's description potentially, per chart review this appears to be in a state of work-up since at least 2017 without definitive etiology. The prior culture results were actually not demonstrative for infectious etiology. Her pain has been responsive to steroids in the past and she is allergic to NSAIDs, given steroids and acetaminophen in the ED to improvement in her pain and thumb spica splint applied given the ttp overlying the anatomic snuffbox. Medications   predniSONE (DELTASONE) tablet 50 mg (50 mg Oral Given 5/11/21 2156)   acetaminophen (TYLENOL) tablet 1,000 mg (1,000 mg Oral Given 5/11/21 2156)     She is encouraged to follow-up with her PCP for her acute arthritis flare and to follow-up with orthopedics hand surgery for potential scaphoid fracture, outpatient MRI versus repeat radiographs. Given d/c instructions and return precautions, pt voices understanding. D/c home, ambulated steadily from the ED. FINAL IMPRESSION      1. Left wrist sprain, initial encounter    2.  Arthritis of left wrist          DISPOSITION/PLAN   DISPOSITION Decision To Discharge 05/11/2021 10:03:32 PM      PATIENT REFERRED TO:  Ann Munguia MD  [address] [address] [facility]  [address] [place] [address]  [phone]    In 3 days      Med Suarez MD  [address] [facility]  [address] [place] [address]  [phone]    In 3 days      [address] [address]  [phone]    If symptoms worsen      DISCHARGE MEDICATIONS:  Discharge Medication List as of 5/11/2021  9:51 PM      START taking these medications    Details   predniSONE (DELTASONE) 50 MG tablet Take 1 tablet by mouth daily for 5 days, Disp-5 tablet, R-0Print      acetaminophen (TYLENOL) 500 MG tablet Take 2 tablets by mouth 3 times daily as needed for Pain, Disp-180 tablet, R-0Print                (Please note that portions of this note were completed with a voice recognition program.Efforts were made to edit the dictations but occasionally words are mis-transcribed.)    Oma Dakin, MD (electronically signed)  Attending Emergency Physician          Oma Dakin, MD  05/11/21 7911

## 2021-05-13 ENCOUNTER — TELEPHONE (OUTPATIENT)
Dept: ORTHOPEDIC SURGERY | Age: 51
End: 2021-05-13

## 2021-05-17 ENCOUNTER — HOSPITAL ENCOUNTER (EMERGENCY)
Age: 51
Discharge: HOME | End: 2021-05-18
Attending: EMERGENCY MEDICINE
Payer: MEDICARE

## 2021-05-17 ENCOUNTER — APPOINTMENT (OUTPATIENT)
Dept: GENERAL RADIOLOGY | Age: 51
End: 2021-05-17
Payer: MEDICARE

## 2021-05-17 ENCOUNTER — APPOINTMENT (OUTPATIENT)
Dept: CT IMAGING | Age: 51
End: 2021-05-17
Payer: MEDICARE

## 2021-05-17 DIAGNOSIS — R10.9 ABDOMINAL WALL PAIN: Primary | ICD-10-CM

## 2021-05-17 LAB
ALBUMIN SERPL-MCNC: 4 G/DL (ref 3.4–5)
ALP BLD-CCNC: 133 U/L (ref 40–129)
ALT SERPL-CCNC: 16 U/L (ref 10–40)
ANION GAP SERPL CALCULATED.3IONS-SCNC: 14 MMOL/L (ref 3–16)
AST SERPL-CCNC: 17 U/L (ref 15–37)
BASOPHILS ABSOLUTE: 0.1 K/UL (ref 0–0.2)
BASOPHILS RELATIVE PERCENT: 1.3 %
BILIRUB SERPL-MCNC: <0.2 MG/DL (ref 0–1)
BILIRUBIN DIRECT: <0.2 MG/DL (ref 0–0.3)
BILIRUBIN, INDIRECT: ABNORMAL MG/DL (ref 0–1)
BUN BLDV-MCNC: 6 MG/DL (ref 7–20)
C-REACTIVE PROTEIN: 27.8 MG/L (ref 0–5.1)
CALCIUM SERPL-MCNC: 9.5 MG/DL (ref 8.3–10.6)
CHLORIDE BLD-SCNC: 105 MMOL/L (ref 99–110)
CO2: 22 MMOL/L (ref 21–32)
CREAT SERPL-MCNC: 0.6 MG/DL (ref 0.6–1.1)
EOSINOPHILS ABSOLUTE: 0.3 K/UL (ref 0–0.6)
EOSINOPHILS RELATIVE PERCENT: 3.4 %
GFR AFRICAN AMERICAN: >60
GFR NON-AFRICAN AMERICAN: >60
GLUCOSE BLD-MCNC: 132 MG/DL (ref 70–99)
HCT VFR BLD CALC: 39.6 % (ref 36–48)
HEMOGLOBIN: 13.2 G/DL (ref 12–16)
LIPASE: 18 U/L (ref 13–60)
LYMPHOCYTES ABSOLUTE: 3.4 K/UL (ref 1–5.1)
LYMPHOCYTES RELATIVE PERCENT: 36.1 %
MCH RBC QN AUTO: 26.6 PG (ref 26–34)
MCHC RBC AUTO-ENTMCNC: 33.3 G/DL (ref 31–36)
MCV RBC AUTO: 79.9 FL (ref 80–100)
MONOCYTES ABSOLUTE: 0.7 K/UL (ref 0–1.3)
MONOCYTES RELATIVE PERCENT: 7.9 %
NEUTROPHILS ABSOLUTE: 4.9 K/UL (ref 1.7–7.7)
NEUTROPHILS RELATIVE PERCENT: 51.3 %
PDW BLD-RTO: 15.6 % (ref 12.4–15.4)
PLATELET # BLD: 327 K/UL (ref 135–450)
PLATELET SLIDE REVIEW: ADEQUATE
PMV BLD AUTO: 8.1 FL (ref 5–10.5)
POTASSIUM REFLEX MAGNESIUM: 3.7 MMOL/L (ref 3.5–5.1)
RBC # BLD: 4.96 M/UL (ref 4–5.2)
REASON FOR REJECTION: NORMAL
REJECTED TEST: NORMAL
SEDIMENTATION RATE, ERYTHROCYTE: 27 MM/HR (ref 0–30)
SLIDE REVIEW: ABNORMAL
SODIUM BLD-SCNC: 141 MMOL/L (ref 136–145)
TOTAL PROTEIN: 7.5 G/DL (ref 6.4–8.2)
TROPONIN: <0.01 NG/ML
WBC # BLD: 9.5 K/UL (ref 4–11)

## 2021-05-17 PROCEDURE — 80076 HEPATIC FUNCTION PANEL: CPT

## 2021-05-17 PROCEDURE — 86140 C-REACTIVE PROTEIN: CPT

## 2021-05-17 PROCEDURE — 99285 EMERGENCY DEPT VISIT HI MDM: CPT

## 2021-05-17 PROCEDURE — 73502 X-RAY EXAM HIP UNI 2-3 VIEWS: CPT

## 2021-05-17 PROCEDURE — 36415 COLL VENOUS BLD VENIPUNCTURE: CPT

## 2021-05-17 PROCEDURE — 84484 ASSAY OF TROPONIN QUANT: CPT

## 2021-05-17 PROCEDURE — 6370000000 HC RX 637 (ALT 250 FOR IP): Performed by: EMERGENCY MEDICINE

## 2021-05-17 PROCEDURE — 83690 ASSAY OF LIPASE: CPT

## 2021-05-17 PROCEDURE — 85652 RBC SED RATE AUTOMATED: CPT

## 2021-05-17 PROCEDURE — 96374 THER/PROPH/DIAG INJ IV PUSH: CPT

## 2021-05-17 PROCEDURE — 96375 TX/PRO/DX INJ NEW DRUG ADDON: CPT

## 2021-05-17 PROCEDURE — 85025 COMPLETE CBC W/AUTO DIFF WBC: CPT

## 2021-05-17 PROCEDURE — 93005 ELECTROCARDIOGRAM TRACING: CPT | Performed by: EMERGENCY MEDICINE

## 2021-05-17 PROCEDURE — 80048 BASIC METABOLIC PNL TOTAL CA: CPT

## 2021-05-17 PROCEDURE — 2500000003 HC RX 250 WO HCPCS: Performed by: EMERGENCY MEDICINE

## 2021-05-17 PROCEDURE — 74176 CT ABD & PELVIS W/O CONTRAST: CPT

## 2021-05-17 PROCEDURE — 71046 X-RAY EXAM CHEST 2 VIEWS: CPT

## 2021-05-17 PROCEDURE — 6360000002 HC RX W HCPCS: Performed by: EMERGENCY MEDICINE

## 2021-05-17 RX ORDER — ONDANSETRON 2 MG/ML
4 INJECTION INTRAMUSCULAR; INTRAVENOUS ONCE
Status: COMPLETED | OUTPATIENT
Start: 2021-05-17 | End: 2021-05-17

## 2021-05-17 RX ORDER — LOSARTAN POTASSIUM AND HYDROCHLOROTHIAZIDE 12.5; 5 MG/1; MG/1
2 TABLET ORAL ONCE
Status: COMPLETED | OUTPATIENT
Start: 2021-05-17 | End: 2021-05-17

## 2021-05-17 RX ORDER — KETAMINE HYDROCHLORIDE 50 MG/ML
0.2 INJECTION, SOLUTION, CONCENTRATE INTRAMUSCULAR; INTRAVENOUS ONCE
Status: COMPLETED | OUTPATIENT
Start: 2021-05-17 | End: 2021-05-17

## 2021-05-17 RX ORDER — CLONIDINE HYDROCHLORIDE 0.1 MG/1
0.3 TABLET ORAL ONCE
Status: COMPLETED | OUTPATIENT
Start: 2021-05-17 | End: 2021-05-17

## 2021-05-17 RX ORDER — HYDRALAZINE HYDROCHLORIDE 25 MG/1
50 TABLET, FILM COATED ORAL ONCE
Status: COMPLETED | OUTPATIENT
Start: 2021-05-17 | End: 2021-05-17

## 2021-05-17 RX ADMIN — ONDANSETRON 4 MG: 2 INJECTION INTRAMUSCULAR; INTRAVENOUS at 21:35

## 2021-05-17 RX ADMIN — HYDRALAZINE HYDROCHLORIDE 50 MG: 25 TABLET, FILM COATED ORAL at 23:23

## 2021-05-17 RX ADMIN — CLONIDINE HYDROCHLORIDE 0.3 MG: 0.1 TABLET ORAL at 23:23

## 2021-05-17 RX ADMIN — KETAMINE HYDROCHLORIDE 20 MG: 50 INJECTION INTRAMUSCULAR; INTRAVENOUS at 21:35

## 2021-05-17 RX ADMIN — HYDROMORPHONE HYDROCHLORIDE 0.5 MG: 1 INJECTION, SOLUTION INTRAMUSCULAR; INTRAVENOUS; SUBCUTANEOUS at 22:53

## 2021-05-17 RX ADMIN — LOSARTAN POTASSIUM AND HYDROCHLOROTHIAZIDE 2 TABLET: 12.5; 5 TABLET ORAL at 23:35

## 2021-05-17 ASSESSMENT — PAIN DESCRIPTION - ORIENTATION: ORIENTATION: RIGHT

## 2021-05-17 ASSESSMENT — PAIN DESCRIPTION - PAIN TYPE: TYPE: ACUTE PAIN

## 2021-05-17 ASSESSMENT — PAIN SCALES - GENERAL
PAINLEVEL_OUTOF10: 9
PAINLEVEL_OUTOF10: 10
PAINLEVEL_OUTOF10: 10

## 2021-05-17 ASSESSMENT — PAIN DESCRIPTION - LOCATION: LOCATION: CHEST;GROIN

## 2021-05-17 NOTE — ED NOTES
Pt states right groin/rib cage has been hurting for approx 2 weeks. NKI. Pt states groin more than rib. Pt states hydrocodone not relieving pain at home, pain has increasing gotten worse in the last couple days.  Pt unable to do ADL's at home      Yanet Knox RN  05/17/21 1952

## 2021-05-18 VITALS
HEART RATE: 79 BPM | WEIGHT: 207.45 LBS | BODY MASS INDEX: 34.56 KG/M2 | SYSTOLIC BLOOD PRESSURE: 145 MMHG | RESPIRATION RATE: 12 BRPM | TEMPERATURE: 98.9 F | DIASTOLIC BLOOD PRESSURE: 91 MMHG | OXYGEN SATURATION: 99 % | HEIGHT: 65 IN

## 2021-05-18 LAB
EKG ATRIAL RATE: 88 BPM
EKG DIAGNOSIS: NORMAL
EKG P AXIS: 58 DEGREES
EKG P-R INTERVAL: 142 MS
EKG Q-T INTERVAL: 366 MS
EKG QRS DURATION: 84 MS
EKG QTC CALCULATION (BAZETT): 442 MS
EKG R AXIS: 40 DEGREES
EKG T AXIS: 53 DEGREES
EKG VENTRICULAR RATE: 88 BPM

## 2021-05-18 PROCEDURE — 93010 ELECTROCARDIOGRAM REPORT: CPT | Performed by: INTERNAL MEDICINE

## 2021-05-18 PROCEDURE — 6360000002 HC RX W HCPCS: Performed by: EMERGENCY MEDICINE

## 2021-05-18 PROCEDURE — 96375 TX/PRO/DX INJ NEW DRUG ADDON: CPT

## 2021-05-18 RX ORDER — KETOROLAC TROMETHAMINE 30 MG/ML
15 INJECTION, SOLUTION INTRAMUSCULAR; INTRAVENOUS ONCE
Status: COMPLETED | OUTPATIENT
Start: 2021-05-18 | End: 2021-05-18

## 2021-05-18 RX ORDER — LIDOCAINE 4 G/G
1 PATCH TOPICAL DAILY
Qty: 30 PATCH | Refills: 0 | Status: SHIPPED | OUTPATIENT
Start: 2021-05-18 | End: 2021-06-17

## 2021-05-18 RX ORDER — HYDROCODONE BITARTRATE AND ACETAMINOPHEN 5; 325 MG/1; MG/1
1 TABLET ORAL EVERY 4 HOURS PRN
Qty: 10 TABLET | Refills: 0 | Status: SHIPPED | OUTPATIENT
Start: 2021-05-18 | End: 2021-05-21

## 2021-05-18 RX ADMIN — KETOROLAC TROMETHAMINE 15 MG: 30 INJECTION, SOLUTION INTRAMUSCULAR at 01:44

## 2021-05-18 ASSESSMENT — PAIN SCALES - GENERAL
PAINLEVEL_OUTOF10: 8
PAINLEVEL_OUTOF10: 7

## 2021-05-18 ASSESSMENT — PAIN - FUNCTIONAL ASSESSMENT: PAIN_FUNCTIONAL_ASSESSMENT: 0-10

## 2021-05-18 NOTE — ED NOTES
Provider order placed for patient's discharge. Provider reviewed decision to discharge with the patient. Discharge paperwork and any prescriptions were reviewed with the patient. Patient verbalized understanding of discharge education and any prescriptions and has no further questions or further needs at this time. Patient left with all personal belongings and was stable upon departure. Patient thanked for choosing [facility] ([facility]) and informed to return should any need arise.          Jerrica Guillen RN  05/18/21 6894

## 2021-05-20 NOTE — ED PROVIDER NOTES
22 21 - 32 mmol/L    Anion Gap 14 3 - 16    Glucose 132 (H) 70 - 99 mg/dL    BUN 6 (L) 7 - 20 mg/dL    CREATININE 0.6 0.6 - 1.1 mg/dL    GFR Non-African American >60 >60    GFR African American >60 >60    Calcium 9.5 8.3 - 10.6 mg/dL   Troponin   Result Value Ref Range    Troponin <0.01 <0.01 ng/mL   C-reactive Protein   Result Value Ref Range    CRP 27.8 (H) 0.0 - 5.1 mg/L   Hepatic Function Panel   Result Value Ref Range    Total Protein 7.5 6.4 - 8.2 g/dL    Albumin 4.0 3.4 - 5.0 g/dL    Alkaline Phosphatase 133 (H) 40 - 129 U/L    ALT 16 10 - 40 U/L    AST 17 15 - 37 U/L    Total Bilirubin <0.2 0.0 - 1.0 mg/dL    Bilirubin, Direct <0.2 0.0 - 0.3 mg/dL    Bilirubin, Indirect see below 0.0 - 1.0 mg/dL   Lipase   Result Value Ref Range    Lipase 18.0 13.0 - 60.0 U/L   SPECIMEN REJECTION   Result Value Ref Range    Rejected Test esr     Reason for Rejection see below    Sedimentation Rate   Result Value Ref Range    Sed Rate 27 0 - 30 mm/Hr   EKG 12 Lead   Result Value Ref Range    Ventricular Rate 88 BPM    Atrial Rate 88 BPM    P-R Interval 142 ms    QRS Duration 84 ms    Q-T Interval 366 ms    QTc Calculation (Bazett) 442 ms    P Axis 58 degrees    R Axis 40 degrees    T Axis 53 degrees    Diagnosis       Normal sinus rhythmNormal ECGWhen compared with ECG of 19-MAY-2019 20:47,No significant change was foundConfirmed by MARYAM SCHROEDER, Jeannie Pinto ([id number]) on 5/18/2021 11:22:14 AM     No results found. Final ED Course and MDM: In brief, Maddi Mack is a 49 y.o. female whose care was signed out to me by the outgoing provider. In brief, patient presented to the ED with multiple complaints. Laboratory work-up did not show any emergent abnormalities. CRP slightly elevated but markedly improved from previous. Sedimentation rate not elevated. No leukocytosis. Imaging did not demonstrate any acute normalities. Patient's symptoms controlled with medications.   Discussed using a combination of oral and topical medications with the patient and have her follow-up with her physicians and starting physical therapy. Patient amenable to treatment plan. Improved on reevaluation. Symptomatic treatment with expectant management discussed with the patient and they and/or family memebers present are amenable to treatment plan and outpatient follow-up. Strict return precautions were discussed with the patient and those present. They demonstrated understanding of when to return to the emergency department for new or worsening symptoms. ED Medication Orders (From admission, onward)    Start Ordered     Status Ordering Provider    05/18/21 0145 05/18/21 0133  ketorolac (TORADOL) injection 15 mg  ONCE      Last MAR action: Given - by Sheron Canal on 05/18/21 at [facility] [facility] [place] [address]    05/17/21 2330 05/17/21 2251  losartan-hydroCHLOROthiazide (HYZAAR) 50-12.5 MG per tablet 2 tablet  ONCE      Last MAR action: Given - by Sheron Canal on 05/17/21 at [address] [facility] Judi Binning    05/17/21 2300 05/17/21 2249  HYDROmorphone (DILAUDID) injection 0.5 mg  ONCE      Last MAR action: Given - by Sheron Canal on 05/17/21 at [facility] [facility] BRICE    05/17/21 2300 05/17/21 2251  cloNIDine (CATAPRES) tablet 0.3 mg  ONCE      Last MAR action: Given - by Sheron Canal on 05/17/21 at [facility]    05/17/21 2300 05/17/21 2251  hydrALAZINE (APRESOLINE) tablet 50 mg  ONCE      Last MAR action: Given - by Sheron Canal on 05/17/21 at [facility] 73    05/17/21 2130 05/17/21 2114  ketamine (KETALAR) injection 20 mg  ONCE      Last MAR action: Given - by Caity Swain on 05/17/21 at [address] [facility] Judi Binning    05/17/21 2115 05/17/21 2114  ondansetron (ZOFRAN) injection 4 mg  ONCE      Last MAR action: Given - by Caity Swain on 05/17/21 at [facility] [facility] Judi Binning          Final Impression      1.  Abdominal wall pain        DISPOSITION Decision To Discharge 05/18/2021 01:34:02 AM (Please note that portions of this note may have been completed with a voice recognition program. Efforts were made to edit the dictations but occasionally words are mis-transcribed.)    Carter Fernandez, [facility], MD  05/19/21 6359

## 2021-05-27 ENCOUNTER — OFFICE VISIT (OUTPATIENT)
Dept: ORTHOPEDIC SURGERY | Age: 51
End: 2021-05-27
Payer: MEDICARE

## 2021-05-27 VITALS — WEIGHT: 198 LBS | BODY MASS INDEX: 32.99 KG/M2 | HEIGHT: 65 IN

## 2021-05-27 DIAGNOSIS — M19.90 INFLAMMATORY ARTHRITIS: ICD-10-CM

## 2021-05-27 DIAGNOSIS — G89.29 CHRONIC PAIN OF LEFT ANKLE: ICD-10-CM

## 2021-05-27 DIAGNOSIS — M25.572 CHRONIC PAIN OF LEFT ANKLE: ICD-10-CM

## 2021-05-27 DIAGNOSIS — M25.532 LEFT WRIST PAIN: Primary | ICD-10-CM

## 2021-05-27 PROCEDURE — G8428 CUR MEDS NOT DOCUMENT: HCPCS | Performed by: PHYSICIAN ASSISTANT

## 2021-05-27 PROCEDURE — G8417 CALC BMI ABV UP PARAM F/U: HCPCS | Performed by: PHYSICIAN ASSISTANT

## 2021-05-27 PROCEDURE — 99204 OFFICE O/P NEW MOD 45 MIN: CPT | Performed by: PHYSICIAN ASSISTANT

## 2021-05-27 PROCEDURE — 3017F COLORECTAL CA SCREEN DOC REV: CPT | Performed by: PHYSICIAN ASSISTANT

## 2021-05-27 PROCEDURE — 4004F PT TOBACCO SCREEN RCVD TLK: CPT | Performed by: PHYSICIAN ASSISTANT

## 2021-06-02 ASSESSMENT — ENCOUNTER SYMPTOMS
ABDOMINAL PAIN: 0
EYE REDNESS: 0
SORE THROAT: 0
SHORTNESS OF BREATH: 0
RHINORRHEA: 0

## 2021-06-02 NOTE — PROGRESS NOTES
Subjective:      Patient ID: Jann Montano is a 49 y.o. female. Chief Complaint   Patient presents with    Wrist Pain     left wrist pain    Ankle Pain     left ankle pain        HPI:   She is here for an initial evaluation of a new problem. Left wrist pain and swelling as well as left ankle pain and swelling. Onset 1 to 2 weeks ago. No history of injury. Questionable history of inflammatory arthritis. Currently not being treated by rheumatology. Pain Scale 5/10 VAS. Location of pain ulnar aspect of the left wrist and anterior lateral aspect of the left ankle. Pain is worse with repetitive activity. Pain improves with elevation. Previous treatments have included Tylenol and occasional ibuprofen. Review Of Systems:   A 14 point review of systems and history form completed by the patient has been reviewed. This scanned in the media tab of the patient's chart under today's date. As outlined in the HPI. Negative for fever or chills. Positive for poly-joint pain, swelling and stiffness. Negative for numbness or tingling.      Past Medical History:   Diagnosis Date    Arthritis     Asthma     CHF (congestive heart failure) ([facility])     COPD (chronic obstructive pulmonary disease) ([facility] [address]     DVT of upper extremity (deep vein thrombosis) ([facility])     Gout     Headache(784.0)     Hypertension     Thyroid disease        Family History   Problem Relation Age of Onset    Hearing Loss Mother     Cancer Maternal Aunt     Hearing Loss Maternal Uncle     Hearing Loss Paternal Aunt     Cancer Paternal Grandmother        Past Surgical History:   Procedure Laterality Date    BRAIN SURGERY      aneurysm clippings     SECTION      x2    CHOLECYSTECTOMY      FOOT SURGERY      HIP SURGERY Right     HYSTERECTOMY      NOSE SURGERY         Social History     Occupational History    Occupation: Homemaker   Tobacco Use    Smoking status: Current Every Day Smoker     Packs/day: 0.50 Years: 20.00     Pack years: 10.00     Types: Cigarettes    Smokeless tobacco: Current User   Substance and Sexual Activity    Alcohol use: Yes     Comment: holidays and birthdays    Drug use: No    Sexual activity: Yes     Partners: Male       Current Outpatient Medications   Medication Sig Dispense Refill    Capsaicin (ZOSTRIX) 0.035 % CREA cream Apply topically 3 times daily 42.5 g 0    lidocaine 4 % external patch Place 1 patch onto the skin daily 30 patch 0    acetaminophen (TYLENOL) 500 MG tablet Take 2 tablets by mouth 3 times daily as needed for Pain 180 tablet 0    fluticasone (FLONASE) 50 MCG/ACT nasal spray 1 spray by Each Nostril route daily 2 Bottle 1    ondansetron (ZOFRAN ODT) 4 MG disintegrating tablet Take 1 tablet by mouth every 8 hours as needed for Nausea 20 tablet 0    diphenhydrAMINE (BENADRYL) 25 MG capsule Take 1-2 capsules by mouth every 6 hours as needed for Itching 20 capsule 0    methocarbamol (ROBAXIN) 500 MG tablet Take 500 mg by mouth 4 times daily      albuterol sulfate HFA (PROVENTIL HFA) 108 (90 Base) MCG/ACT inhaler Inhale 2 puffs into the lungs every 6 hours as needed for Wheezing or Shortness of Breath May substitute ProAir MDI 1 Inhaler 2    metoclopramide (REGLAN) 10 MG tablet Take 1 tablet by mouth 4 times daily WARNING:  May cause drowsiness. May impair ability to operate vehicles or machinery. Do not use in combination with alcohol. 20 tablet 0    losartan-hydrochlorothiazide (HYZAAR) 100-25 MG per tablet Take 1 tablet by mouth daily      ALPRAZolam (XANAX) 0.5 MG tablet Take 0.5 mg by mouth 3 times daily as needed for Anxiety .       hydrALAZINE (APRESOLINE) 50 MG tablet Take 50 mg by mouth daily       metoprolol succinate (TOPROL XL) 100 MG extended release tablet Take 200 mg by mouth daily      atorvastatin (LIPITOR) 40 MG tablet Take 40 mg by mouth daily      levothyroxine (SYNTHROID) 50 MCG tablet Take 50 mcg by mouth Daily      promethazine (PHENERGAN) 25 MG tablet Take 25 mg by mouth every 8 hours as needed for Nausea      betamethasone dipropionate (DIPROLENE) 0.05 % cream Apply topically daily Apply topically 2 times daily.  triamcinolone (KENALOG) 0.1 % cream Apply topically daily Apply topically 2 times daily.  bisacodyl (DULCOLAX) 5 MG EC tablet Take 5 mg by mouth daily as needed for Constipation      HYDROcodone-acetaminophen (NORCO) 5-325 MG per tablet Take 1 tablet by mouth every 6 hours as needed for Pain      cloNIDine (CATAPRES) 0.3 MG tablet Take 0.3 mg by mouth three times daily      NIFEdipine (PROCARDIA XL) 90 MG extended release tablet Take 1 tablet by mouth daily 30 tablet 3    omeprazole (PRILOSEC) 20 MG capsule Take 20 mg by mouth daily.  zolpidem (AMBIEN) 5 MG tablet Take 10 mg by mouth nightly as needed for Sleep  . No current facility-administered medications for this visit. Objective:     She is alert, oriented x 3, pleasant, well nourished, developed and in no   acute distress. Ht 5' 5\" (1.651 m)   Wt 198 lb (89.8 kg)   BMI 32.95 kg/m²      Examination of the left wrist:  She has mild diffuse swelling over the ulnar aspect of the left wrist.  She has pain while palpation over the ulnar styloid. There is no skeletal deformity. There is minimal tenderness to palpation over the dorsal, volar or radial aspect of the wrist.  Wrist range of motion is  limited by pain. Digital range of motion is not limited by pain and swelling. FDS,FDP and Common Extensor tendon function is intact to each digit. FPL and EPL tendon function is intact to the thumb. Phalen's test negative. Tinel's test negative. Finkelstein's test negative. Skin color, texture, turgor is normal.  No rashes or lesions found of the injured extremity. Vascular exam shows normal  And good capillary refill bilaterally. Sensation is subjectively normal in the whole hand. Digits are normally sensate.      Examination of the left ankle demonstrates: There is mild swelling of the lateral ankle. There is mild joint effusion. There is  moderate tenderness over the posterior tibialis tendon region. There is no Pes Planus. There is no tenderness of the fifth metatarsal.  There is no tenderness over the ATFL. There is no tenderness over the proximal fibula. There is no tenderness of the calcaneous. The achilles is intact. Vizcaino test negative. There is no laxity with anterior drawer testing. There is no laxity with Inversion testing. There is no laxity with Eversion testing. There is no tenderness of the lateral malleolus. There is no tenderness of the medial malleolus. There is no tenderness of the fifth metatarsal.  There is no tenderness over the ATFL. There is no tenderness over the proximal fibula. There is no tenderness of the calcaneous. The achilles is intact. Vizcaino test negative. There is no laxity with anterior drawer testing. There is no laxity with Inversion testing. There is no laxity with Eversion testing. Examination of the lower extremities are intact with sensation to light touch. Motor testing  5/5 in all major motor groups of the lower extremities. Negative Avila's Sign. SLR negative. Examination of the lower extremities shows intact perfusion to all extremities. No cyanosis. Digits are warm to touch, capillary refill is less than 2 seconds. There is no edema noted. Examination of the skin over both lower extremities reveals: The skin to be intact without lacerations or abrasions. No significant erythema. No significant rashes or skin lesions.      X Rays from [address] [place] [address] or an outside facility:      EXAMINATION:   3 XRAY VIEWS OF THE LEFT WRIST       5/11/2021 8:24 pm       COMPARISON:   None.       HISTORY:   ORDERING SYSTEM PROVIDED HISTORY: L wrist pain   TECHNOLOGIST PROVIDED HISTORY:   Reason for exam:->L wrist pain   Reason for Exam: Left wrist pain   Acuity: Acute   Type of Exam: Initial   Mechanism of Injury: Wrist Injury (left wrist injured x4 days ago after dog   pulled her with leash)   Relevant Medical/Surgical History: Current Every Day Smoker, 0.5 ppd, 10   pack-years; Alcohol:Yes.  Hx of hypertension and obesity.       FINDINGS:   Skeletal structures are intact.  No fracture or dislocation.  No significant   soft tissue abnormalities.           Impression   No significant finding of the left wrist.           X Rays: performed in the office today:   AP, Lateral and Oblique Left Ankle:  Radiographs demonstrate normal alignment of the ankle joint. There are no fractures or dislocations noted. Additional Tests reviewed: none  Additional Outside Records reviewed: none    Diagnosis:       ICD-10-CM    1. Left wrist pain  M25.532 Ximena Nazario MD, Rheumatology, [facility]   2. Chronic pain of left ankle  M25.572 XR ANKLE LEFT (MIN 3 VIEWS)    G89.29 Ximena Nazario MD, Rheumatology, [facility]   3. Inflammatory arthritis  M19.90 Crys Dai MD, Rheumatology, [facility]        Assessment and Plan:       Assessment:  Left wrist pain over the ulnar styloid with mild synovitis. Left lateral ankle pain over the peroneal tendons with mild synovitis and mild joint effusion. She has a history of inflammatory arthritis of some sort. She is not clear as to the type of arthritis. visit. Plan:  Medications-   OTC NSAIDS discussed. She  was advised that NSAID-type medications have two very important potential side effects: gastrointestinal irritation including hemorrhage and renal injuries. She was asked to take the medication with food and to stop if she experiences any GI upset. I asked her to call for vomiting, abdominal pain or black/bloody stools. She should have renal function testing per his medical provider periodically. The patient expresses understanding of these issues and questions were answered. Procedures-I have recommended splinting of the wrist and ankle. She already has an elastic sleeve for the ankle and rest.    She will be referred to rheumatology,       Follow up:   Call or return to clinic if these symptoms worsen or fail to improve as anticipated.

## 2021-06-03 NOTE — ED PROVIDER NOTES
thrombosis) ([facility] [address]     Gout     Headache(784.0)     Hypertension     Thyroid disease          SURGICALHISTORY       Past Surgical History:   Procedure Laterality Date    BRAIN SURGERY      aneurysm clippings     SECTION      x2    CHOLECYSTECTOMY      FOOT SURGERY      HIP SURGERY Right     HYSTERECTOMY      NOSE SURGERY           CURRENT MEDICATIONS       Discharge Medication List as of 2021  1:41 AM      CONTINUE these medications which have NOT CHANGED    Details   acetaminophen (TYLENOL) 500 MG tablet Take 2 tablets by mouth 3 times daily as needed for Pain, Disp-180 tablet, R-0Print      fluticasone (FLONASE) 50 MCG/ACT nasal spray 1 spray by Each Nostril route daily, Disp-2 Bottle,R-1Normal      ondansetron (ZOFRAN ODT) 4 MG disintegrating tablet Take 1 tablet by mouth every 8 hours as needed for Nausea, Disp-20 tablet, R-0Print      diphenhydrAMINE (BENADRYL) 25 MG capsule Take 1-2 capsules by mouth every 6 hours as needed for Itching, Disp-20 capsule, R-0Print      methocarbamol (ROBAXIN) 500 MG tablet Take 500 mg by mouth 4 times dailyHistorical Med      albuterol sulfate HFA (PROVENTIL HFA) 108 (90 Base) MCG/ACT inhaler Inhale 2 puffs into the lungs every 6 hours as needed for Wheezing or Shortness of Breath May substitute ProAir MDI, Disp-1 Inhaler, R-2Print      metoclopramide (REGLAN) 10 MG tablet Take 1 tablet by mouth 4 times daily WARNING:  May cause drowsiness. May impair ability to operate vehicles or machinery. Do not use in combination with alcohol., Disp-20 tablet, R-0Print      losartan-hydrochlorothiazide (HYZAAR) 100-25 MG per tablet Take 1 tablet by mouth dailyHistorical Med      ALPRAZolam (XANAX) 0.5 MG tablet Take 0.5 mg by mouth 3 times daily as needed for Anxiety . Historical Med      hydrALAZINE (APRESOLINE) 50 MG tablet Take 50 mg by mouth daily Historical Med      metoprolol succinate (TOPROL XL) 100 MG extended release tablet Take 200 mg by mouth dailyHistorical Med      atorvastatin (LIPITOR) 40 MG tablet Take 40 mg by mouth dailyHistorical Med      levothyroxine (SYNTHROID) 50 MCG tablet Take 50 mcg by mouth DailyHistorical Med      promethazine (PHENERGAN) 25 MG tablet Take 25 mg by mouth every 8 hours as needed for NauseaHistorical Med      betamethasone dipropionate (DIPROLENE) 0.05 % cream Apply topically daily Apply topically 2 times daily. , Topical, DAILY, Historical Med      triamcinolone (KENALOG) 0.1 % cream Apply topically daily Apply topically 2 times daily. , Topical, DAILY, Historical Med      bisacodyl (DULCOLAX) 5 MG EC tablet Take 5 mg by mouth daily as needed for ConstipationHistorical Med      !! HYDROcodone-acetaminophen (NORCO) 5-325 MG per tablet Take 1 tablet by mouth every 6 hours as needed for Pain      cloNIDine (CATAPRES) 0.3 MG tablet Take 0.3 mg by mouth three times daily      NIFEdipine (PROCARDIA XL) 90 MG extended release tablet Take 1 tablet by mouth daily, Disp-30 tablet, R-3      omeprazole (PRILOSEC) 20 MG capsule Take 20 mg by mouth daily. zolpidem (AMBIEN) 5 MG tablet Take 10 mg by mouth nightly as needed for Sleep  . Historical Med       !! - Potential duplicate medications found. Please discuss with provider.           ALLERGIES     Latex, Ace inhibitors, Ibuprofen, Iv [iodides], Lisinopril, Morphine, Other, and Shellfish-derived products    FAMILY HISTORY       Family History   Problem Relation Age of Onset    Hearing Loss Mother     Cancer Maternal Aunt     Hearing Loss Maternal Uncle     Hearing Loss Paternal Aunt     Cancer Paternal Grandmother           SOCIAL HISTORY       Social History     Socioeconomic History    Marital status: Single     Spouse name: None    Number of children: None    Years of education: None    Highest education level: None   Occupational History    Occupation: Homemaker   Tobacco Use    Smoking status: Current Every Day Smoker     Packs/day: 0.50     Years: 20.00     Pack years: 10.00     Types: Cigarettes    Smokeless tobacco: Current User   Substance and Sexual Activity    Alcohol use: Yes     Comment: holidays and birthdays    Drug use: No    Sexual activity: Yes     Partners: Male   Other Topics Concern    None   Social History Narrative    None     Social Determinants of Health     Financial Resource Strain:     Difficulty of Paying Living Expenses:    Food Insecurity:     Worried About Running Out of Food in the Last Year:     Ran Out of Food in the Last Year:    Transportation Needs:     Lack of Transportation (Medical):  Lack of Transportation (Non-Medical):    Physical Activity:     Days of Exercise per Week:     Minutes of Exercise per Session:    Stress:     Feeling of Stress :    Social Connections:     Frequency of Communication with Friends and Family:     Frequency of Social Gatherings with Friends and Family:     Attends [employer] Services:     Active Member of Clubs or Organizations:     Attends Club or Organization Meetings:     Marital Status:    Intimate Partner Violence:     Fear of Current or Ex-Partner:     Emotionally Abused:     Physically Abused:     Sexually Abused:        SCREENINGS             PHYSICAL EXAM    (up to 7 for level 4, 8 or more for level 5)     ED Triage Vitals [05/17/21 1903]   BP Temp Temp Source Pulse Resp SpO2 Height Weight   (!) 200/114 99.4 °F (37.4 °C) Temporal 91 20 99 % 5' 5\" (1.651 m) 207 lb 7.3 oz (94.1 kg)       Physical Exam  Vitals and nursing note reviewed. Constitutional:       Appearance: She is well-developed. She is not diaphoretic. HENT:      Head: Normocephalic and atraumatic. Eyes:      General:         Right eye: No discharge. Left eye: No discharge. Conjunctiva/sclera: Conjunctivae normal.   Cardiovascular:      Rate and Rhythm: Normal rate. Pulses: Normal pulses. Heart sounds: No murmur heard.      Pulmonary:      Effort: Pulmonary effort is normal. No respiratory distress. Breath sounds: Normal breath sounds. No wheezing or rhonchi. Abdominal:      Palpations: Abdomen is soft. Tenderness: There is abdominal tenderness. Hernia: No hernia is present. Comments: Tender in the RLQ. No rebound or guarding    Musculoskeletal:         General: Normal range of motion. Cervical back: Neck supple. Comments: No crepitus, not red or hot. No pain with axial loading. Distal pulses present   Skin:     General: Skin is warm and dry. Capillary Refill: Capillary refill takes less than 2 seconds. Neurological:      Mental Status: She is alert. Comments: Normal speech and thought. Normal sensation. Psychiatric:         Behavior: Behavior normal.         DIAGNOSTIC RESULTS     EKG: All EKG's are interpreted by the Emergency Department Physician who either signs or Co-signsthis chart in the absence of a cardiologist.  The Ekg interpreted by me shows  normal sinus rhythm with a rate of 88  Axis is   58  QTc is  442 msec  Intervals and Durations are unremarkable. ST Segments: normal  No significant change from prior EKG dated 5/19/19            RADIOLOGY:   Non-plain filmimages such as CT, Ultrasound and MRI are read by the radiologist. Plain radiographic images are visualized and preliminarily interpreted by the emergency physician with the below findings:        Interpretation per the Radiologist below, if available at the time ofthis note:    CT ABDOMEN PELVIS WO CONTRAST Additional Contrast? None   Final Result   No acute intra-abdominal abnormality         XR HIP 2-3 VW W PELVIS RIGHT   Final Result   Mild osteoarthritic changes in both hips with no acute bony abnormality. XR CHEST (2 VW)   Final Result   No acute cardiopulmonary process.                ED BEDSIDE ULTRASOUND:   Performed by ED Physician - none    LABS:  Labs Reviewed   CBC WITH AUTO DIFFERENTIAL - Abnormal; Notable for the following components: Result Value    MCV 79.9 (*)     RDW 15.6 (*)     All other components within normal limits    Narrative:     Performed at:  [address] [address]   Phone [phone]   BASIC METABOLIC PANEL W/ REFLEX TO MG FOR LOW K - Abnormal; Notable for the following components:    Glucose 132 (*)     BUN 6 (*)     All other components within normal limits    Narrative:     Performed at:  [address] [address]   Phone [phone]   C-REACTIVE PROTEIN - Abnormal; Notable for the following components:    CRP 27.8 (*)     All other components within normal limits    Narrative:     Fred Ernst tel. [phone],  Rejected Test Name/Called to: noah lorenzana, 05/17/2021 22:18, by [facility]  Performed at:  [address] [address]   Phone [phone]   HEPATIC FUNCTION PANEL - Abnormal; Notable for the following components:    Alkaline Phosphatase 133 (*)     All other components within normal limits    Narrative:     Fred Ernst tel. [phone],  Rejected Test Name/Called to: noah lorenzana, 05/17/2021 22:18, by [facility]  Performed at:  [address] [address]   Phone [phone]   TROPONIN    Narrative:     Performed at:  [facility]  [address] [place] [address]   Phone [phone]    Narrative:     Fred Ernst tel. [phone],  Rejected Test Name/Called to: noah lorenzana, 05/17/2021 22:18, by [facility]  [address]formed at:  [address] [address]   Phone [phone]    Narrative:     Fred Ernst tel. [phone],  Rejected Test Name/Called to: noah lorenzana, 05/17/2021 22:18, by [facility]  Performed at:  [facility] significant/life threatening deterioration in the patient's condition which required my urgent intervention. CONSULTS:  None    PROCEDURES:  Unless otherwise noted below, none     Procedures    FINAL IMPRESSION      1. Abdominal wall pain          DISPOSITION/PLAN   DISPOSITION Decision To Discharge 05/18/2021 01:34:02 AM      PATIENT REFERRED TO:  Alethea Main MD  [address]  [address] [address] [address]  [phone]    Schedule an appointment as soon as possible for a visit   As needed      DISCHARGE MEDICATIONS:  Discharge Medication List as of 5/18/2021  1:41 AM      START taking these medications    Details   Capsaicin (ZOSTRIX) 0.035 % CREA cream Apply topically 3 times daily, Topical, 3 TIMES DAILY Starting Tue 5/18/2021, Disp-42.5 g, R-0, Normal      lidocaine 4 % external patch Place 1 patch onto the skin daily, Transdermal, DAILY Starting Tue 5/18/2021, Until Thu 6/17/2021, For 30 days, Disp-30 patch, R-0, Normal      !! HYDROcodone-acetaminophen (NORCO) 5-325 MG per tablet Take 1 tablet by mouth every 4 hours as needed for Pain for up to 3 days. Intended supply: 3 days. Take lowest dose possible to manage pain, Disp-10 tablet, R-0Print       !! - Potential duplicate medications found. Please discuss with provider.              (Please note that portions of this note were completed with a voice recognition program.Efforts were made to edit the dictations but occasionally words are mis-transcribed.)    Kelvin Diaz MD (electronically signed)  Attending Emergency Physician         Kelvin Diaz MD  06/02/21 [id number]

## 2021-08-11 NOTE — PROGRESS NOTES
2021  Patient Name: Brenda Cruz  : 1970  Medical Record: <[id number]>      ASSESSMENT AND PLAN    Assessment/Plan:      ASSESSMENT:    1. Inflammatory arthritis    2. Other fatigue    3. Encounter for screening for other viral diseases         PLAN:     Lady Sung was seen today for establish care. Diagnoses and all orders for this visit:    Inflammatory arthritis  -     CBC Auto Differential; Future  -     Comprehensive Metabolic Panel; Future  -     C-Reactive Protein; Future  -     Cyclic Citrul Peptide Antibody, IgG; Future  -     Sedimentation Rate; Future  -     Rheumatoid Factor; Future  -     Hepatitis B Core Antibody, IgM; Future  -     Hepatitis B Surface Antigen; Future  -     Hepatitis C Antibody; Future  -     Quantiferon, Incubated; Future  -     HLA-B27 Antigen; Future  -     C.trachomatis N.gonorrhoeae DNA, Urine; Future  -     Uric Acid; Future  -     Miscellaneous Sendout 1; Future  -     GLUCOSE 6 PHOSPHATE DEHYDROGENASE; Future  -     XR SacroilIAC Joints PA and Oblique; Future  -     XR ANKLE LEFT (MIN 3 VIEWS); Future  -     XR ANKLE RIGHT (MIN 3 VIEWS); Future  -     predniSONE (DELTASONE) 5 MG tablet; Take 4 tabs daily for 4 days, 3 tabs daily for 4 days, 2 tabs daily for 4 days, 1 tab daily for 4 days and stop    Other fatigue  -     CBC Auto Differential; Future    Encounter for screening for other viral diseases   -     Hepatitis B Surface Antigen; Future    History suggestive of inflammatory arthritis-migratory polyarthritis, dactylitis, at least tendinitis  No history of psoriasis, inflammatory bowel disease, inflammatory back pain  History of sexually transmitted diseases [chlamydia, gonorrhea and Treponema pallidum]  No recent urinary discharge or UTIs or diarrhea. Possible reactive arthritis/peripheral spondyloarthropathy  Work-up in the past has revealed negative RF, CCP, HLA-B27, NARA, uric acid. SI joint x-rays no evidence of sacroiliitis.   Elevated ESR and CRP  Will repeat work-up for rheumatoid arthritis/systemic rheumatic disease   We will check QuantiFERON-TB gold, hepatitis panel, G6PD in preparation for DMARD therapy  Prednisone taper in the meantime  Further management pending test results  The patient indicates understanding of these issues and agrees with the plan. Return in about 6 weeks (around 9/23/2021). The risks and benefits of my recommendations, as well as other treatment options, benefits and side effects werediscussed with the patient. All questions were answered. I reviewed patient's history, referral documents and electronic medical records  Copy of consult note is being routedelectronically/faxed to referring physician         MEDICATIONS  Current Outpatient Medications   Medication Sig Dispense Refill    loratadine (CLARITIN) 10 MG tablet Take 10 mg by mouth daily      furosemide (LASIX) 40 MG tablet Take 40 mg by mouth daily      predniSONE (DELTASONE) 5 MG tablet Take 4 tabs daily for 4 days, 3 tabs daily for 4 days, 2 tabs daily for 4 days, 1 tab daily for 4 days and stop 40 tablet 1    acetaminophen (TYLENOL) 500 MG tablet Take 2 tablets by mouth 3 times daily as needed for Pain 180 tablet 0    fluticasone (FLONASE) 50 MCG/ACT nasal spray 1 spray by Each Nostril route daily 2 Bottle 1    ondansetron (ZOFRAN ODT) 4 MG disintegrating tablet Take 1 tablet by mouth every 8 hours as needed for Nausea 20 tablet 0    diphenhydrAMINE (BENADRYL) 25 MG capsule Take 1-2 capsules by mouth every 6 hours as needed for Itching 20 capsule 0    albuterol sulfate HFA (PROVENTIL HFA) 108 (90 Base) MCG/ACT inhaler Inhale 2 puffs into the lungs every 6 hours as needed for Wheezing or Shortness of Breath May substitute ProAir MDI 1 Inhaler 2    losartan-hydrochlorothiazide (HYZAAR) 100-25 MG per tablet Take 1 tablet by mouth daily      ALPRAZolam (XANAX) 0.5 MG tablet Take 0.5 mg by mouth 3 times daily as needed for Anxiety .       hydrALAZINE (APRESOLINE) 50 MG tablet Take 50 mg by mouth daily       atorvastatin (LIPITOR) 40 MG tablet Take 40 mg by mouth daily      promethazine (PHENERGAN) 25 MG tablet Take 25 mg by mouth every 8 hours as needed for Nausea      betamethasone dipropionate (DIPROLENE) 0.05 % cream Apply topically daily Apply topically 2 times daily.  bisacodyl (DULCOLAX) 5 MG EC tablet Take 5 mg by mouth daily as needed for Constipation      HYDROcodone-acetaminophen (NORCO) 5-325 MG per tablet Take 1 tablet by mouth every 6 hours as needed for Pain      cloNIDine (CATAPRES) 0.3 MG tablet Take 0.3 mg by mouth three times daily      NIFEdipine (PROCARDIA XL) 90 MG extended release tablet Take 1 tablet by mouth daily 30 tablet 3    omeprazole (PRILOSEC) 20 MG capsule Take 20 mg by mouth daily.  zolpidem (AMBIEN) 5 MG tablet Take 10 mg by mouth nightly as needed for Sleep  . No current facility-administered medications for this visit. ALLERGIES  Allergies   Allergen Reactions    Latex     Ace Inhibitors     Ibuprofen      Patient states she takes at home    Iv [Iodides]     Lisinopril Swelling    Morphine Hives     Nausea and itching    Other      TB skin test    Shellfish-Derived Products Swelling    Tuberculin Tests Swelling         Comments  No specialty comments available. REFERRING PHYSICIAN:Steven D Dorothey Ganser, PA    HISTORY OF PRESENT ILLNESS  Radha Edgar is a 49 y.o. female with past medical history of congestive heart failure, COPD, hypertension, diabetes, thyroid disease, DVT of right upper extremity who is being seen for follow up evaluation of  joint pain. Patient was last seen in 2018 when she presented with migratory polyarthritis. She initially presented with a right hip pain, swelling and stiffness. She had effusion in the right hip on CT. She underwent incision and drainage. Synovial fluid analysis showed CBC of 2838 and 83% neutrophils. Blood cultures were negative.   She was treated with IV antibiotics. She also had a bone biopsy that showed mild chronic inflammation. She moved to [place] and did not follow-up. She returned to [place] 1-1/2 years ago. Has not seen a rheumatologist in [place]. Since then she has been complaining of migratory joint pain lasting for few days at a time and travels from 1 joint to another. She gets flareups every 2 to 4 weeks. Each flareup last for up to 1 week. She has had symptoms in elbows, wrists, hands, shoulders, hips, knees, ankles. Most recent flareup was in the right ankle associated with pain, swelling and stiffness. She usually takes prednisone as needed for flareups. She has been to the ER multiple times for flareups. She also takes Norco and uses Biofreeze. She gets severe itching with ibuprofen. She denies rash, abdominal pain, chest pain with these episodes. She gets fever sometimes and has been recorded as high as 100°F.  She also has a low back pain which gets worse with activity and improves with rest.  She has stiffness in the back lasting for few minutes. She denies radiation, tingling or numbness, weakness, bowel or bladder dysfunction or saddle anesthesia  She reports dactylitis with some of the flareups. She denies psoriasis, inflammatory bowel disease, inflammatory back pain, uveitis. She denies family history of rheumatoid arthritis. She has a history of multiple sexually transmitted diseases as a teenager. She denies any urinary discharge or diarrhea or UTIs. Work-up in the past revealed negative RF, CCP, HLA-B27, NARA, uric acid with elevated ESR and CRP. SI joint x-rays in the past did not show any evidence of sacroiliitis.   HPI  Review of Systems    REVIEW OF SYSTEMS: Positive for shortness of breath, wheezing, DVT, 3 miscarriages, dry eyes, intermittent fever   Constitutional: No unanticipated weight loss   Integumentary: No rash, photosensitivity, malar rash, livedo reticularis, alopecia and Raynaud's symptoms, sclerodactyly, skin tightening  Eyes: negative for visual disturbance and persistent redness, discharge from eyes   ENT: - No tinnitus, loss of hearing, vertigo, or recurrent ear infections.  - No history of nasal/oral ulcers. - No history of Dry mouth  Cardiovascular: No history of pericarditis, chest pain or murmur or palpitations  Respiratory: No cough or history of interstitial lung disease. No history of pleurisy. No history of tuberculosis or atypical infections. Gastrointestinal: No history of heart burn, dysphagia or esophageal dysmotility. No change in bowel habits or any inflammatory bowel disease. Genitourinary: No history of renal disease  Hematologic/Lymphatic: No abnormal bruising or bleeding,swollen lymph nodes. Neurological: No history of headaches, seizure or focal weakness. No history of neuropathies, paresthesias or hyperesthesias, facial droop, diplopia  Endocrine: Denies any polyuria, polydipsia and osteoporosis  Allergic/Immunologic: No nasal congestion or hives. I have reviewed patients Past medical History, Social History and Family History as mentioned in her chart and this remains unchanged fromprevious.     Past Medical History:   Diagnosis Date    Arthritis     Asthma     CHF (congestive heart failure) (HCC)     COPD (chronic obstructive pulmonary disease) (HCC)     DVT of upper extremity (deep vein thrombosis) (HCC)     Gout     Headache(784.0)     Hypertension     Thyroid disease      Past Surgical History:   Procedure Laterality Date    BRAIN SURGERY      aneurysm clippings     SECTION      x2    CHOLECYSTECTOMY      FOOT SURGERY      HIP SURGERY Right     HYSTERECTOMY      NOSE SURGERY       Social History     Socioeconomic History    Marital status: Single     Spouse name: Not on file    Number of children: Not on file    Years of education: Not on file    Highest education level: Not on file   Occupational History    Occupation: Homemaker   Tobacco Use    Smoking status: Current Every Day Smoker     Packs/day: 0.50     Years: 20.00     Pack years: 10.00     Types: Cigarettes    Smokeless tobacco: Current User   Vaping Use    Vaping Use: Never used   Substance and Sexual Activity    Alcohol use: Yes     Comment: holidays and birthdays    Drug use: No    Sexual activity: Yes     Partners: Male   Other Topics Concern    Not on file   Social History Narrative    Not on file     Social Determinants of Health     Financial Resource Strain:     Difficulty of Paying Living Expenses:    Food Insecurity:     Worried About Running Out of Food in the Last Year:     Ran Out of Food in the Last Year:    Transportation Needs:     Lack of Transportation (Medical):      Lack of Transportation (Non-Medical):    Physical Activity:     Days of Exercise per Week:     Minutes of Exercise per Session:    Stress:     Feeling of Stress :    Social Connections:     Frequency of Communication with Friends and Family:     Frequency of Social Gatherings with Friends and Family:     Attends Baptism Services:     Active Member of Clubs or Organizations:     Attends Club or Organization Meetings:     Marital Status:    Intimate Partner Violence:     Fear of Current or Ex-Partner:     Emotionally Abused:     Physically Abused:     Sexually Abused:      Family History   Problem Relation Age of Onset    Hearing Loss Mother     Cancer Maternal Aunt     Hearing Loss Maternal Uncle     Hearing Loss Paternal Aunt     Cancer Paternal Grandmother          PHYSICAL EXAM   Vitals:    08/12/21 1239   BP: 127/82   Pulse: 70   Weight: 199 lb 12.8 oz (90.6 kg)     Physical Exam  Constitutional:  Well developed, well nourished, no acute distress, non-toxic appearance   Musculoskeletal:    RIGHT  Swell  Tender  ROM  LEFT  Swell  Tender  ROM    DIP2  0  0  FULL   0  0  FULL    DIP3  0  0  FULL   0  0  FULL    DIP4  0  0  FULL   0  0  FULL    DIP5 0  0  FULL   0  0  FULL    PIP1  0  0  FULL   0  0  FULL    PIP2  0  0  FULL   0  0  FULL    PIP3  0  0  FULL   0  0  FULL    PIP4  0  0  FULL   0  0  FULL    PIP5  0  0  FULL   0  0  FULL    MCP1  0  0  FULL   0  0  FULL    MCP2  0  0  FULL   0  0  FULL    MCP3  0  0  FULL   0  0  FULL    MCP4  0  0  FULL   0  0  FULL    MCP5  0  0  FULL   0  0  FULL    Wrist  0  0  FULL   0  0  FULL    Elbow  0  0  FULL   0  0  FULL    Shouldr  0  0  FULL   0  0  FULL    Hip  0  0  FULL   0  0  FULL    Knee  0  0   crepitus  0  0   crepitus   Ankle  ++ ++ FULL   + + FULL    MTP1  0  0  FULL   0  0  FULL    MTP2  0  0  FULL   0  0  FULL    MTP3  0  0  FULL   0  0  FULL    MTP4  0  0  FULL   0  0  FULL    MTP5  0  0  FULL   0  0  FULL    IP1  0  0  FULL   0  0  FULL    IP2  0  0  FULL   0  0  FULL    IP3  0  0  FULL   0  0  FULL    IP4  0  0  FULL   0  0  FULL    IP5  0  0  FULL   0 0 FULL     Right Achilles tendinitis: Swelling, tenderness, erythema  Ambulates without assistance, normal gait  Neck: Full ROM, no tenderness,supple   Back-bilateral SI joint tenderness, deep tendon reflexes 2+, bilateral SLR negative  Eyes:  PERRL, extra ocular movements intact, conjunctiva normal   HEENT:  Atraumatic, normocephalic, external ears normal, oropharynx moist, no pharyngeal exudates. Respiratory:  No respiratory distress  GI:  Soft, nondistended, normal bowel sounds, nontender, noorganomegaly, no mass, no rebound, no guarding   :  No costovertebral angle tenderness   Integument:  Well hydrated, no rash or telangiectasias  Lymphatic:  No lymphadenopathy noted   Neurologic:   Alert & oriented x 3, CN 2-12 normal, no focal deficits noted. Sensations Intact. Muscle strength 5/5 proximally and distally in upper and lower extremities.    Psychiatric:  Speech and behavior appropriate           LABS AND IMAGING  Outside data reviewed and in HPI    Lab Results   Component Value Date    WBC 9.5 05/17/2021    RBC 4.96 05/17/2021    HGB 13.2 MD, 8/12/2021 1:29 PM    Documentation was done using voice recognition dragon software. Every effort was made to ensure accuracy;however, inadvertent unintentional computerized transcription errors may be present.

## 2021-08-12 ENCOUNTER — OFFICE VISIT (OUTPATIENT)
Dept: RHEUMATOLOGY | Age: 51
End: 2021-08-12
Payer: MEDICARE

## 2021-08-12 ENCOUNTER — HOSPITAL ENCOUNTER (OUTPATIENT)
Dept: GENERAL RADIOLOGY | Age: 51
Discharge: HOME | End: 2021-08-12
Payer: MEDICARE

## 2021-08-12 ENCOUNTER — HOSPITAL ENCOUNTER (EMERGENCY)
Age: 51
Discharge: HOME | End: 2021-08-12
Payer: MEDICARE

## 2021-08-12 ENCOUNTER — HOSPITAL ENCOUNTER (OUTPATIENT)
Age: 51
End: 2021-08-12
Payer: MEDICARE

## 2021-08-12 VITALS
BODY MASS INDEX: 33.75 KG/M2 | HEART RATE: 71 BPM | DIASTOLIC BLOOD PRESSURE: 100 MMHG | OXYGEN SATURATION: 100 % | WEIGHT: 202.82 LBS | RESPIRATION RATE: 14 BRPM | TEMPERATURE: 98.7 F | SYSTOLIC BLOOD PRESSURE: 150 MMHG

## 2021-08-12 VITALS
WEIGHT: 199.8 LBS | DIASTOLIC BLOOD PRESSURE: 82 MMHG | SYSTOLIC BLOOD PRESSURE: 127 MMHG | HEART RATE: 70 BPM | BODY MASS INDEX: 33.25 KG/M2

## 2021-08-12 DIAGNOSIS — M19.90 INFLAMMATORY ARTHRITIS: Primary | ICD-10-CM

## 2021-08-12 DIAGNOSIS — Z20.2 POSSIBLE EXPOSURE TO STD: Primary | ICD-10-CM

## 2021-08-12 DIAGNOSIS — M19.90 INFLAMMATORY ARTHRITIS: ICD-10-CM

## 2021-08-12 DIAGNOSIS — Z11.59 ENCOUNTER FOR SCREENING FOR OTHER VIRAL DISEASES: ICD-10-CM

## 2021-08-12 DIAGNOSIS — R53.83 OTHER FATIGUE: ICD-10-CM

## 2021-08-12 LAB
BACTERIA WET PREP: NORMAL
BACTERIA: ABNORMAL /HPF
BILIRUBIN URINE: NEGATIVE
BLOOD, URINE: ABNORMAL
CLARITY: CLEAR
CLUE CELLS: NORMAL
COLOR: YELLOW
EPITHELIAL CELLS WET PREP: NORMAL
EPITHELIAL CELLS, UA: ABNORMAL /HPF (ref 0–5)
GLUCOSE URINE: NEGATIVE MG/DL
HCG(URINE) PREGNANCY TEST: NEGATIVE
KETONES, URINE: NEGATIVE MG/DL
LEUKOCYTE ESTERASE, URINE: NEGATIVE
MICROSCOPIC EXAMINATION: YES
MUCUS: ABNORMAL /LPF
NITRITE, URINE: NEGATIVE
PH UA: 6.5 (ref 5–8)
PROTEIN UA: NEGATIVE MG/DL
RBC UA: ABNORMAL /HPF (ref 0–4)
RBC WET PREP: NORMAL
SOURCE WET PREP: NORMAL
SPECIFIC GRAVITY UA: 1.01 (ref 1–1.03)
TRICHOMONAS PREP: NORMAL
URINE REFLEX TO CULTURE: ABNORMAL
URINE TYPE: ABNORMAL
UROBILINOGEN, URINE: 0.2 E.U./DL
WBC UA: ABNORMAL /HPF (ref 0–5)
WBC WET PREP: NORMAL
YEAST WET PREP: NORMAL

## 2021-08-12 PROCEDURE — 96372 THER/PROPH/DIAG INJ SC/IM: CPT

## 2021-08-12 PROCEDURE — 99204 OFFICE O/P NEW MOD 45 MIN: CPT | Performed by: INTERNAL MEDICINE

## 2021-08-12 PROCEDURE — 72202 X-RAY EXAM SI JOINTS 3/> VWS: CPT

## 2021-08-12 PROCEDURE — 3017F COLORECTAL CA SCREEN DOC REV: CPT | Performed by: INTERNAL MEDICINE

## 2021-08-12 PROCEDURE — 87591 N.GONORRHOEAE DNA AMP PROB: CPT

## 2021-08-12 PROCEDURE — 6370000000 HC RX 637 (ALT 250 FOR IP): Performed by: NURSE PRACTITIONER

## 2021-08-12 PROCEDURE — 99283 EMERGENCY DEPT VISIT LOW MDM: CPT

## 2021-08-12 PROCEDURE — 87210 SMEAR WET MOUNT SALINE/INK: CPT

## 2021-08-12 PROCEDURE — 87491 CHLMYD TRACH DNA AMP PROBE: CPT

## 2021-08-12 PROCEDURE — 73610 X-RAY EXAM OF ANKLE: CPT

## 2021-08-12 PROCEDURE — 81001 URINALYSIS AUTO W/SCOPE: CPT

## 2021-08-12 PROCEDURE — G8417 CALC BMI ABV UP PARAM F/U: HCPCS | Performed by: INTERNAL MEDICINE

## 2021-08-12 PROCEDURE — 6360000002 HC RX W HCPCS: Performed by: NURSE PRACTITIONER

## 2021-08-12 PROCEDURE — 84703 CHORIONIC GONADOTROPIN ASSAY: CPT

## 2021-08-12 PROCEDURE — G8427 DOCREV CUR MEDS BY ELIG CLIN: HCPCS | Performed by: INTERNAL MEDICINE

## 2021-08-12 PROCEDURE — 4004F PT TOBACCO SCREEN RCVD TLK: CPT | Performed by: INTERNAL MEDICINE

## 2021-08-12 RX ORDER — GABAPENTIN 300 MG/1
600 CAPSULE ORAL 3 TIMES DAILY
Qty: 18 CAPSULE | Refills: 0 | Status: SHIPPED | OUTPATIENT
Start: 2021-08-12 | End: 2021-08-12

## 2021-08-12 RX ORDER — CEFTRIAXONE 500 MG/1
500 INJECTION, POWDER, FOR SOLUTION INTRAMUSCULAR; INTRAVENOUS ONCE
Status: COMPLETED | OUTPATIENT
Start: 2021-08-12 | End: 2021-08-12

## 2021-08-12 RX ORDER — LORATADINE 10 MG/1
10 TABLET ORAL DAILY
COMMUNITY
Start: 2021-07-19

## 2021-08-12 RX ORDER — DOXYCYCLINE HYCLATE 100 MG
100 TABLET ORAL ONCE
Status: COMPLETED | OUTPATIENT
Start: 2021-08-12 | End: 2021-08-12

## 2021-08-12 RX ORDER — PREDNISONE 1 MG/1
TABLET ORAL
Qty: 40 TABLET | Refills: 1 | Status: SHIPPED | OUTPATIENT
Start: 2021-08-12

## 2021-08-12 RX ORDER — FUROSEMIDE 40 MG/1
40 TABLET ORAL DAILY
COMMUNITY
Start: 2021-04-26

## 2021-08-12 RX ORDER — DOXYCYCLINE HYCLATE 100 MG
100 TABLET ORAL 2 TIMES DAILY
Qty: 14 TABLET | Refills: 0 | Status: SHIPPED | OUTPATIENT
Start: 2021-08-12 | End: 2021-08-19

## 2021-08-12 RX ADMIN — DOXYCYCLINE HYCLATE 100 MG: 100 TABLET, COATED ORAL at 16:27

## 2021-08-12 RX ADMIN — CEFTRIAXONE SODIUM 500 MG: 500 INJECTION, POWDER, FOR SOLUTION INTRAMUSCULAR; INTRAVENOUS at 16:27

## 2021-08-12 NOTE — ED PROVIDER NOTES
Take 1 tablet by mouth daily 30 tablet 3    omeprazole (PRILOSEC) 20 MG capsule Take 20 mg by mouth daily.  zolpidem (AMBIEN) 5 MG tablet Take 10 mg by mouth nightly as needed for Sleep  . Allergies   Allergen Reactions    Latex     Ace Inhibitors     Ibuprofen      Patient states she takes at home    Iv [Iodides]     Lisinopril Swelling    Morphine Hives     Nausea and itching    Other      TB skin test    Shellfish-Derived Products Swelling    Tuberculin Tests Swelling       REVIEW OF SYSTEMS  6 systems reviewed, pertinent positives per HPI otherwise noted to be negative    PHYSICAL EXAM  BP (!) 150/100   Pulse 71   Temp 98.7 °F (37.1 °C)   Resp 14   Wt 202 lb 13.2 oz (92 kg)   SpO2 100%   BMI 33.75 kg/m²   GENERAL APPEARANCE: Awake and alert. Cooperative. No acute distress. HEAD: Normocephalic. Atraumatic. EYES: PERRL. EOM's grossly intact. ENT: Mucous membranes are moist.   NECK: Supple. Normal ROM. CHEST: Equal symmetric chest rise. LUNGS: Breathing is unlabored. Speaking comfortably in full sentences. Abdomen: Nondistended. Nontender. + Bowel sounds x4 quadrants. Pelvic exam: Deferred. Patient self swabbed. EXTREMITIES: MAEE. No acute deformities. SKIN: Warm and dry. No rashes noted to exposed skin. NEUROLOGICAL: Alert and oriented. Strength is 5/5 in all extremities and sensation is intact. RADIOLOGY  No results found. ED COURSE  Patient did not require analgesia while in the emergency department.     Labs ordered  I have reviewed and interpreted all of the currently available lab results from this visit:  Results for orders placed or performed during the hospital encounter of 08/12/21   Wet prep, genital    Specimen: Vaginal   Result Value Ref Range    Trichomonas Prep None Seen     Yeast, Wet Prep None Seen     Clue Cells, Wet Prep None Seen     WBC, Wet Prep <1+     RBC, Wet Prep None Seen     Epi Cells 1+     Bacteria 3+     Source Wet Prep Vaginal C.trachomatis N.gonorrhoeae DNA    Specimen: Cervix   Result Value Ref Range    C. trachomatis DNA Negative Negative    N. gonorrhoeae DNA Negative Negative   Pregnancy, Urine   Result Value Ref Range    HCG(Urine) Pregnancy Test Negative Detects HCG level >20 MIU/mL   Urine reflex to culture    Specimen: Urine, clean catch   Result Value Ref Range    Color, UA Yellow Straw/Yellow    Clarity, UA Clear Clear    Glucose, Ur Negative Negative mg/dL    Bilirubin Urine Negative Negative    Ketones, Urine Negative Negative mg/dL    Specific Gravity, UA 1.010 1.005 - 1.030    Blood, Urine TRACE-INTACT (A) Negative    pH, UA 6.5 5.0 - 8.0    Protein, UA Negative Negative mg/dL    Urobilinogen, Urine 0.2 <2.0 E.U./dL    Nitrite, Urine Negative Negative    Leukocyte Esterase, Urine Negative Negative    Microscopic Examination YES     Urine Type NotGiven     Urine Reflex to Culture Not Indicated    Microscopic Urinalysis   Result Value Ref Range    Mucus, UA Rare (A) None Seen /LPF    WBC, UA 0-2 0 - 5 /HPF    RBC, UA 0-2 0 - 4 /HPF    Epithelial Cells, UA 0-1 0 - 5 /HPF    Bacteria, UA 1+ (A) None Seen /HPF           Imaging ordered  None      A discussion was had with Mrs. Joe Neff regarding possible exposure to STI and intention to discharge. Risk management discussed and shared decision making had with patient and/or surrogate. All questions were answered. Patient will follow up with her PCP and the STI clinic for further evaluation/treatment. Patient will return to ED for new/worsening symptoms. Patient was sent home with a prescription for doxycycline. MDM  Patient presents to the emergency department with vaginal discharge and concern for possible STI exposure.  Alternative diagnoses are less likely based on history and physical. Considered UTI, Pyelonephritis, Chlamydia/Gonorrhea, Bacterial Vaginosis, Yeast vaginitis, Trichomonas, Herpes genitalia, Venereal Warts (condyloma acuminata), Syphilis, HIV/AIDS, Chancroid (Haemophilus ducreyi), among others but less likely based on history and physical.         Work-up reveals    C. trachomatis and gonorrhea DNA: Pending    Urinalysis reflex to culture: Trace intact blood, negative for ketones, nitrites, or leukocyte esterase, otherwise unremarkable    Microscopic urinalysis: Mucus rare, bacteria 1+, but no WBCs or RBCs, otherwise unremarkable    Urine pregnancy: Negative    Wet prep, genital: No trichomonas, yeast, clue cells, or other significant abnormality, unremarkable      Therefore:  I feel that the patient is also an appropriate discharged to home with outpatient follow-up with her PCP in the next 1-2 days for reevaluation and with the STD clinic if she would like additional testing. Patient was treated empirically for her request for gonorrhea and received her first dose of doxycycline here in the emergency department and will be sent home with a prescription for doxycycline 100 mg tablet: Take 1 tablet by mouth 2 times daily for the next 7 days. She will return to the emergency department for new or worsening symptoms including, but not limited to, inability to tolerate food or drink, developing open sores on your genitals, joint aches/pains, fever, chills, sweats, or other concerns. Patient verbalizes understanding is agreeable with plan for discharge and follow-up.       Clinical impression  Possible exposure to STD      DISPOSITION  Discharged      Aubrey Glasgow CNP  [id number] - CNP  08/14/21 [id number]

## 2021-08-13 LAB
C TRACH DNA GENITAL QL NAA+PROBE: NEGATIVE
N. GONORRHOEAE DNA: NEGATIVE

## 2021-08-16 ENCOUNTER — APPOINTMENT (OUTPATIENT)
Dept: GENERAL RADIOLOGY | Age: 51
End: 2021-08-16
Payer: MEDICARE

## 2021-08-16 ENCOUNTER — HOSPITAL ENCOUNTER (EMERGENCY)
Age: 51
Discharge: HOME | End: 2021-08-16
Payer: MEDICARE

## 2021-08-16 VITALS
HEART RATE: 84 BPM | OXYGEN SATURATION: 100 % | SYSTOLIC BLOOD PRESSURE: 172 MMHG | BODY MASS INDEX: 34.49 KG/M2 | DIASTOLIC BLOOD PRESSURE: 111 MMHG | RESPIRATION RATE: 16 BRPM | WEIGHT: 207 LBS | TEMPERATURE: 99.9 F | HEIGHT: 65 IN

## 2021-08-16 DIAGNOSIS — M19.90 ARTHRITIS: ICD-10-CM

## 2021-08-16 DIAGNOSIS — M25.571 ACUTE RIGHT ANKLE PAIN: Primary | ICD-10-CM

## 2021-08-16 PROCEDURE — 73630 X-RAY EXAM OF FOOT: CPT

## 2021-08-16 PROCEDURE — 99283 EMERGENCY DEPT VISIT LOW MDM: CPT

## 2021-08-16 PROCEDURE — 6360000002 HC RX W HCPCS: Performed by: NURSE PRACTITIONER

## 2021-08-16 PROCEDURE — 96372 THER/PROPH/DIAG INJ SC/IM: CPT

## 2021-08-16 PROCEDURE — 73610 X-RAY EXAM OF ANKLE: CPT

## 2021-08-16 RX ORDER — KETOROLAC TROMETHAMINE 15 MG/ML
15 INJECTION, SOLUTION INTRAMUSCULAR; INTRAVENOUS ONCE
Status: COMPLETED | OUTPATIENT
Start: 2021-08-16 | End: 2021-08-16

## 2021-08-16 RX ADMIN — KETOROLAC TROMETHAMINE 15 MG: 15 INJECTION, SOLUTION INTRAMUSCULAR; INTRAVENOUS at 20:56

## 2021-08-16 ASSESSMENT — PAIN - FUNCTIONAL ASSESSMENT: PAIN_FUNCTIONAL_ASSESSMENT: 0-10

## 2021-08-16 ASSESSMENT — PAIN DESCRIPTION - LOCATION: LOCATION: ANKLE

## 2021-08-16 ASSESSMENT — PAIN SCALES - GENERAL
PAINLEVEL_OUTOF10: 8
PAINLEVEL_OUTOF10: 7
PAINLEVEL_OUTOF10: 8

## 2021-08-16 ASSESSMENT — PAIN DESCRIPTION - ORIENTATION: ORIENTATION: RIGHT

## 2021-08-16 ASSESSMENT — PAIN DESCRIPTION - DESCRIPTORS: DESCRIPTORS: ACHING

## 2021-08-16 ASSESSMENT — PAIN DESCRIPTION - PAIN TYPE: TYPE: ACUTE PAIN

## 2021-08-17 NOTE — ED PROVIDER NOTES
[address] [facility] ENCOUNTER        Pt Name: Amy Boothe  MRN: [id number]  [place] 1970  Date of evaluation: 8/16/2021  Provider: WINNIE Davidson - CNP  PCP: Bernadine Castano MD  Note Started: 9:03 PM EDT       ISHMAEL. I have evaluated this patient. My supervising physician was available for consultation. CHIEF COMPLAINT       Chief Complaint   Patient presents with    Ankle Pain     pt states arthritis exacerbation pain to R ankle x5days. HISTORY OF PRESENT ILLNESS   (Location, Timing/Onset, Context/Setting, Quality, Duration, Modifying Factors, Severity, Associated Signs and Symptoms)  Note limiting factors. Chief Complaint: Right ankle pain    Amy Boothe is a 49 y.o. female with medical history of asthma, hypothyroidism, headache, tobacco abuse, SIRS, HTN, HLD, inflammatory arthritis, DVT who presents the emergency department with complaints of increased pain and swelling to her right ankle for the past week. States that she does get frequent flareups of her arthritis and this feels similar. She does take Vicodin at home for her arthritis flareups. States she is starting to get some pain and swelling into the left ankle. She denies any recent trauma, accidents, injuries, or falls. States she frequently get a injection of Toradol and this does seem to help with the pain and swelling. I informed her that she has ibuprofen listed as an allergy, she states she is able to take this without difficulty. She denies any associated numbness, tingling, weakness, later, dizzy, syncope, rashes, redness, or deformity. 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 I informed her on the elevated blood pressure reading and she does note that she is due to take her clonidine tonight. She smokes half pack per day, occasional alcohol use, denies street drugs or IVDA.     Nursing Notes were all reviewed and agreed with or any disagreements were addressed in the HPI. REVIEW OF SYSTEMS    (2-9 systems for level 4, 10 or more for level 5)     Review of Systems    Positives and Pertinent negatives as per HPI. Except as noted above in the ROS, all other systems were reviewed and negative. PAST MEDICAL HISTORY     Past Medical History:   Diagnosis Date    Arthritis     Asthma     CHF (congestive heart failure) ([address]     COPD (chronic obstructive pulmonary disease) ([address]     DVT of upper extremity (deep vein thrombosis) (HCC)     Gout     Headache(784.0)     Hypertension     Thyroid disease          SURGICAL HISTORY     Past Surgical History:   Procedure Laterality Date    BRAIN SURGERY      aneurysm clippings     SECTION      x2    CHOLECYSTECTOMY      FOOT SURGERY      HIP SURGERY Right     HYSTERECTOMY      NOSE SURGERY           CURRENTMEDICATIONS       Previous Medications    ACETAMINOPHEN (TYLENOL) 500 MG TABLET    Take 2 tablets by mouth 3 times daily as needed for Pain    ALBUTEROL SULFATE HFA (PROVENTIL HFA) 108 (90 BASE) MCG/ACT INHALER    Inhale 2 puffs into the lungs every 6 hours as needed for Wheezing or Shortness of Breath May substitute ProAir MDI    ALPRAZOLAM (XANAX) 0.5 MG TABLET    Take 0.5 mg by mouth 3 times daily as needed for Anxiety . ATORVASTATIN (LIPITOR) 40 MG TABLET    Take 40 mg by mouth daily    BETAMETHASONE DIPROPIONATE (DIPROLENE) 0.05 % CREAM    Apply topically daily Apply topically 2 times daily.     BISACODYL (DULCOLAX) 5 MG EC TABLET    Take 5 mg by mouth daily as needed for Constipation    CLONIDINE (CATAPRES) 0.3 MG TABLET    Take 0.3 mg by mouth three times daily    DIPHENHYDRAMINE (BENADRYL) 25 MG CAPSULE    Take 1-2 capsules by mouth every 6 hours as needed for Itching    DOXYCYCLINE HYCLATE (VIBRA-TABS) 100 MG TABLET    Take 1 tablet by mouth 2 times daily for 7 days    FLUTICASONE (FLONASE) 50 MCG/ACT NASAL SPRAY    1 spray by Each Nostril route daily    FUROSEMIDE (LASIX) 40 MG TABLET    Take 40 mg by mouth daily    HYDRALAZINE (APRESOLINE) 50 MG TABLET    Take 50 mg by mouth daily     HYDROCODONE-ACETAMINOPHEN (NORCO) 5-325 MG PER TABLET    Take 1 tablet by mouth every 6 hours as needed for Pain    LORATADINE (CLARITIN) 10 MG TABLET    Take 10 mg by mouth daily    LOSARTAN-HYDROCHLOROTHIAZIDE (HYZAAR) 100-25 MG PER TABLET    Take 1 tablet by mouth daily    NIFEDIPINE (PROCARDIA XL) 90 MG EXTENDED RELEASE TABLET    Take 1 tablet by mouth daily    OMEPRAZOLE (PRILOSEC) 20 MG CAPSULE    Take 20 mg by mouth daily. ONDANSETRON (ZOFRAN ODT) 4 MG DISINTEGRATING TABLET    Take 1 tablet by mouth every 8 hours as needed for Nausea    PREDNISONE (DELTASONE) 5 MG TABLET    Take 4 tabs daily for 4 days, 3 tabs daily for 4 days, 2 tabs daily for 4 days, 1 tab daily for 4 days and stop    PROMETHAZINE (PHENERGAN) 25 MG TABLET    Take 25 mg by mouth every 8 hours as needed for Nausea    ZOLPIDEM (AMBIEN) 5 MG TABLET    Take 10 mg by mouth nightly as needed for Sleep  .          ALLERGIES     Latex, Ace inhibitors, Ibuprofen, Iv [iodides], Lisinopril, Morphine, Other, Shellfish-derived products, and Tuberculin tests    FAMILYHISTORY       Family History   Problem Relation Age of Onset    Hearing Loss Mother     Cancer Maternal Aunt     Hearing Loss Maternal Uncle     Hearing Loss Paternal Aunt     Cancer Paternal Grandmother           SOCIAL HISTORY       Social History     Tobacco Use    Smoking status: Current Every Day Smoker     Packs/day: 0.50     Years: 20.00     Pack years: 10.00     Types: Cigarettes    Smokeless tobacco: Current User   Vaping Use    Vaping Use: Never used   Substance Use Topics    Alcohol use: Yes     Comment: holidays and birthdays    Drug use: No       SCREENINGS             PHYSICAL EXAM    (up to 7 for level 4, 8 or more for level 5)     ED Triage Vitals [08/16/21 2013]   BP Temp Temp Source Pulse Resp SpO2 are read by the radiologist. Ruy Toure radiographic images are visualized and preliminarily interpreted by the ED Provider with the below findings:        Interpretation per the Radiologist below, if available at the time of this note:    XR ANKLE RIGHT (MIN 3 VIEWS)   Final Result   No acute fracture or dislocation. FOOT FINDINGS:   No acute fracture. No dislocation. Chronic appearing 1st distal phalangeal   tuft fracture. Chronic ossicles adjacent to the cuboid. Linear region of   increased attenuation in calcaneus may be chronic. Nonspecific stranding in   Hoffa's fat pad. IMPRESSION:   No acute fracture or dislocation. Nonspecific inflammatory changes in Hoffa's fat pad. XR FOOT RIGHT (MIN 3 VIEWS)   Final Result   No acute fracture or dislocation. FOOT FINDINGS:   No acute fracture. No dislocation. Chronic appearing 1st distal phalangeal   tuft fracture. Chronic ossicles adjacent to the cuboid. Linear region of   increased attenuation in calcaneus may be chronic. Nonspecific stranding in   Hoffa's fat pad. IMPRESSION:   No acute fracture or dislocation. Nonspecific inflammatory changes in Hoffa's fat pad. XR SACROILIAC JOINTS (MIN 3 VIEWS)    Result Date: 8/13/2021  EXAMINATION: THREE XRAY VIEWS OF THE SACRO-ILIAC JOINTS 8/12/2021 2:43 pm COMPARISON: 05/17/2021 HISTORY: ORDERING SYSTEM PROVIDED HISTORY: Inflammatory arthritis TECHNOLOGIST PROVIDED HISTORY: Reason for exam:->pain Reason for Exam: Inflammatory arthritis Acuity: Acute Type of Exam: Initial FINDINGS: No erosions are present in the sacroiliac joints. Minimal osteophytes are present without significant joint space loss in the sacroiliac joints. The symphysis pubis and hip joints are maintained. Pelvis is otherwise intact. 1. Minimal sacroiliac joint osteophytes. Otherwise no significant arthropathy.      XR ANKLE LEFT (MIN 3 VIEWS)    Result Date: 8/12/2021  EXAMINATION: THREE XRAY VIEWS OF THE LEFT ANKLE 8/12/2021 2:43 pm COMPARISON: 05/27/2021. HISTORY: ORDERING SYSTEM PROVIDED HISTORY: Inflammatory arthritis TECHNOLOGIST PROVIDED HISTORY: Reason for Exam: Inflammatory arthritis Acuity: Acute Type of Exam: Initial FINDINGS: No evidence of acute fracture or dislocation. Normal alignment of the ankle mortise. No focal osseous lesion or rosea in. No evidence of joint effusion. There is mild lateral soft tissue swelling. No acute osseous abnormality of the ankle. XR ANKLE RIGHT (MIN 3 VIEWS)    Result Date: 8/16/2021  EXAMINATION: THREE XRAY VIEWS OF THE RIGHT ANKLE;   XRAY VIEWS OF THE RIGHT FOOT 8/16/2021 8:10 pm COMPARISON: None. HISTORY: ORDERING SYSTEM PROVIDED HISTORY: pain TECHNOLOGIST PROVIDED HISTORY: Reason for exam:->pain Reason for Exam: lateral right ankle pain for 5 days denies injury Acuity: Acute Type of Exam: Initial; ORDERING SYSTEM PROVIDED HISTORY: trauma, TECHNOLOGIST PROVIDED HISTORY: Reason for exam:->trauma, Reason for Exam: lateral right foot pain denies injury sx for 5 days Acuity: Acute Type of Exam: Initial ANKLE FINDINGS: No acute fracture. No dislocation. Mortise appears intact on suboptimal mortise view. No effusion. Moderate to large soft tissue edema. No acute fracture or dislocation. FOOT FINDINGS: No acute fracture. No dislocation. Chronic appearing 1st distal phalangeal tuft fracture. Chronic ossicles adjacent to the cuboid. Linear region of increased attenuation in calcaneus may be chronic. Nonspecific stranding in Hoffa's fat pad. IMPRESSION: No acute fracture or dislocation. Nonspecific inflammatory changes in Hoffa's fat pad.      XR ANKLE RIGHT (MIN 3 VIEWS)    Result Date: 8/13/2021  EXAMINATION: THREE XRAY VIEWS OF THE RIGHT ANKLE 8/12/2021 2:43 pm COMPARISON: 12/04/2017 HISTORY: ORDERING SYSTEM PROVIDED HISTORY: Inflammatory arthritis TECHNOLOGIST PROVIDED HISTORY: Reason for Exam: Inflammatory arthritis Acuity: Acute Type of Exam: Initial complaints of  right ankle pain. Vital signs were reviewed. Exam well-developed, well-nourished female who appears uncomfortable. Imaging ordered. - Pertinent Labs & Imaging studies reviewed. (See chart for details)   -  Patient seen and evaluated in the emergency department. -  Triage and nursing notes reviewed and incorporated. -  Old chart records reviewed and incorporated. -  ISHMAEL. I have evaluated this patient. My supervising physician was available for consultation.  -  Differential diagnosis includes: Strain, fracture, dislocation, sprain, subluxation, septic arthritis, rheumatoid arthritis, gout, tendinitis, versus COVID-19  -  Work-up included:  See above  -  ED treatment included: Toradol   -  Results discussed with patient. Arlette Pierre is a 52-year-old female presents the emergency department with complaints of pain and swelling into her right lateral ankle. States it feels like one of her arthritis flareups. She frequently gets these and needs to have a Toradol injection. She has been taking her Vicodin at home without complete relief of symptoms. On exam she does have some mild edema and tenderness around the right lateral malleolus. Imaging was obtained. X-ray right ankle shows no acute fracture or dislocation. X-ray right foot shows no acute fracture no dislocation. A chronic appearing first distal phalangeal tuft fracture. Chronic osseous adjacent to the cuboid. Linear region of increased attenuation in calcaneus may be chronic. Nonspecific stranding and Hoffa's fat pad. Patient was informed on these results. States she does not have a podiatrist or orthopedist that she follows regularly. She was given follow-up referrals. She was encouraged to continue taking her Vicodin at home as prescribed. Shared decision made was completed patient stable discharge at this time. She is calling her ride. FINAL IMPRESSION      1. Acute right ankle pain    2.  Arthritis DISPOSITION/PLAN   DISPOSITION Decision To Discharge 08/16/2021 08:50:01 PM      PATIENT REFERRED TO:  Melissa Agrawal., MD  [address]  [address] [address]  [address] [facility] [phone]    Call in 2 days  As needed, If symptoms worsen    [facility] [address]  [phone]  Go to   As needed    [address]  [address]  [facility] [address]  Call   As needed      DISCHARGE MEDICATIONS:  New Prescriptions    No medications on file       DISCONTINUED MEDICATIONS:  Discontinued Medications    No medications on file              (Please note that portions of this note were completed with a voice recognition program.  Efforts were made to edit the dictations but occasionally words are mis-transcribed.)    WINNIE Crockett CNP (electronically signed)            WINNIE Crockett CNP  08/17/21 1901

## 2021-09-27 ENCOUNTER — TELEPHONE (OUTPATIENT)
Dept: INTERNAL MEDICINE CLINIC | Age: 51
End: 2021-09-27

## 2021-09-27 DIAGNOSIS — R76.8 POSITIVE ANA (ANTINUCLEAR ANTIBODY): Primary | ICD-10-CM

## 2021-09-27 DIAGNOSIS — B18.2 CHRONIC HEPATITIS C WITHOUT HEPATIC COMA (HCC): ICD-10-CM

## 2021-09-27 DIAGNOSIS — R76.12 POSITIVE QUANTIFERON-TB GOLD TEST: ICD-10-CM

## 2021-09-27 NOTE — TELEPHONE ENCOUNTER
Patient states she received a letter that she is dismissed from rheumatology practice. Patient states she did not get results and needs physician to explain the results to her. none

## 2021-09-28 NOTE — TELEPHONE ENCOUNTER
Please call the patient and let her know that she has positive hepatitis C. She was referred to GI last time. I will place a new referral if she has not already seen GI for hepatitis C. She also has positive count from [facility] which is a latent test for TB. I will obtain chest x-ray and refer to ID for evaluation  She has low titer positive NARA 1: 80 speckled pattern. Most likely is a false positive test but I will check additional labs to completely rule out lupus.

## 2021-10-15 ENCOUNTER — CLINICAL DOCUMENTATION (OUTPATIENT)
Dept: OTHER | Age: 51
End: 2021-10-15

## 2021-11-01 NOTE — TELEPHONE ENCOUNTER
Called pt. She has followed up with her pcp. She has a referral to GI to follow up regarding the hepatitis C. She has had a CXR already with her PCP and no active TB. She will go ahead and get the additional labs that [facility] asked to get to rule out lupus.